# Patient Record
Sex: FEMALE | Race: WHITE | NOT HISPANIC OR LATINO
[De-identification: names, ages, dates, MRNs, and addresses within clinical notes are randomized per-mention and may not be internally consistent; named-entity substitution may affect disease eponyms.]

---

## 2017-04-27 ENCOUNTER — TRANSCRIPTION ENCOUNTER (OUTPATIENT)
Age: 32
End: 2017-04-27

## 2018-06-01 ENCOUNTER — TRANSCRIPTION ENCOUNTER (OUTPATIENT)
Age: 33
End: 2018-06-01

## 2022-04-06 ENCOUNTER — NON-APPOINTMENT (OUTPATIENT)
Age: 37
End: 2022-04-06

## 2022-04-06 DIAGNOSIS — Z80.3 FAMILY HISTORY OF MALIGNANT NEOPLASM OF BREAST: ICD-10-CM

## 2022-04-06 DIAGNOSIS — Z86.19 PERSONAL HISTORY OF OTHER INFECTIOUS AND PARASITIC DISEASES: ICD-10-CM

## 2022-04-06 DIAGNOSIS — Z82.49 FAMILY HISTORY OF ISCHEMIC HEART DISEASE AND OTHER DISEASES OF THE CIRCULATORY SYSTEM: ICD-10-CM

## 2022-04-06 DIAGNOSIS — Z78.9 OTHER SPECIFIED HEALTH STATUS: ICD-10-CM

## 2022-04-06 DIAGNOSIS — Z98.890 OTHER SPECIFIED POSTPROCEDURAL STATES: ICD-10-CM

## 2022-04-06 DIAGNOSIS — Z87.59 PERSONAL HISTORY OF OTHER COMPLICATIONS OF PREGNANCY, CHILDBIRTH AND THE PUERPERIUM: ICD-10-CM

## 2022-04-06 RX ORDER — MEDROXYPROGESTERONE ACETATE 400 MG/ML
INJECTION, SUSPENSION INTRAMUSCULAR
Refills: 0 | Status: ACTIVE | COMMUNITY

## 2022-09-02 ENCOUNTER — APPOINTMENT (OUTPATIENT)
Dept: OBGYN | Facility: CLINIC | Age: 37
End: 2022-09-02

## 2022-09-02 VITALS — WEIGHT: 188 LBS | HEART RATE: 80 BPM | HEIGHT: 66 IN | BODY MASS INDEX: 30.22 KG/M2

## 2022-09-02 LAB
HCG UR QL: POSITIVE
QUALITY CONTROL: YES

## 2022-09-02 PROCEDURE — 99395 PREV VISIT EST AGE 18-39: CPT

## 2022-09-02 PROCEDURE — 81025 URINE PREGNANCY TEST: CPT

## 2022-09-02 RX ORDER — FOLIC ACID 1 MG/1
1 TABLET ORAL DAILY
Qty: 90 | Refills: 3 | Status: ACTIVE | COMMUNITY
Start: 2022-09-02 | End: 1900-01-01

## 2022-09-02 NOTE — PHYSICAL EXAM
[Chaperone Present] : A chaperone was present in the examining room during all aspects of the physical examination [Appropriately responsive] : appropriately responsive [Alert] : alert [No Acute Distress] : no acute distress [Regular Rate Rhythm] : regular rate rhythm [No Murmurs] : no murmurs [Clear to Auscultation B/L] : clear to auscultation bilaterally [Soft] : soft [Non-tender] : non-tender [Non-distended] : non-distended [No Lesions] : no lesions [No Mass] : no mass [Oriented x3] : oriented x3 [Examination Of The Breasts] : a normal appearance [No Masses] : no breast masses were palpable [Labia Majora] : normal [Labia Minora] : normal [Normal] : normal [Enlarged ___ wks] : enlarged [unfilled] ~Uweeks [Anteversion] : anteverted [Uterine Adnexae] : normal [FreeTextEntry6] : No masses, nontender, no skin changes, nipple discharge, no adenopathy. [Tenderness] : nontender

## 2022-09-02 NOTE — PLAN
[FreeTextEntry1] : Patient is a 36-year-old  6 para 2-0-3-2 last menstrual period 2022\par Patient presents for a evaluation after having had a positive home pregnancy test\par Patient conceived while on birth control pills\par Physical exam reveals a well-developed well-nourished female slightly obese,,, BMI 30\par Heart regular rhythm and rate, lungs clear, breast no mass nontender no skin change no nipple discharge no adenopathy, abdomen soft nontender no organomegaly.\par Pelvic exam shows normal female external genitalia, vagina no lesions, cervix appropriate size nontender, uterus anteverted slightly bulky nontender, adnexa no mass nontender.\par Vaginal culture performed\par Pap smear performed\par Patient is a multivitamin and a prescription for folic acid will be sent to the pharmacy\par Patient states she was diagnosed with COVID back in 2021,,, denies being vaccinated\par Patient will be referred to [facility] for an OB sono for dating also verification of the pregnancy,,, patient is recent loss within the past year showed fetal calvarium head abnormalities on sono evaluation

## 2022-09-02 NOTE — HISTORY OF PRESENT ILLNESS
[FreeTextEntry1] : Patient is a 36-year-old  6 para 2-0-3-2 last menstrual period 2022\par Patient presents for initial visit after having a positive home pregnancy test\par Patient had been on birth control pills low Loestrin the entire time

## 2022-09-06 ENCOUNTER — ASOB RESULT (OUTPATIENT)
Age: 37
End: 2022-09-06

## 2022-09-06 ENCOUNTER — APPOINTMENT (OUTPATIENT)
Dept: ANTEPARTUM | Facility: CLINIC | Age: 37
End: 2022-09-06

## 2022-09-06 PROCEDURE — 76817 TRANSVAGINAL US OBSTETRIC: CPT

## 2022-09-20 ENCOUNTER — NON-APPOINTMENT (OUTPATIENT)
Age: 37
End: 2022-09-20

## 2022-09-20 ENCOUNTER — APPOINTMENT (OUTPATIENT)
Dept: OBGYN | Facility: CLINIC | Age: 37
End: 2022-09-20

## 2022-09-20 VITALS
DIASTOLIC BLOOD PRESSURE: 90 MMHG | SYSTOLIC BLOOD PRESSURE: 143 MMHG | BODY MASS INDEX: 30.67 KG/M2 | WEIGHT: 190 LBS | HEART RATE: 78 BPM

## 2022-09-20 PROCEDURE — 0500F INITIAL PRENATAL CARE VISIT: CPT

## 2022-09-20 RX ORDER — LABETALOL HYDROCHLORIDE 100 MG/1
100 TABLET, FILM COATED ORAL
Qty: 180 | Refills: 3 | Status: ACTIVE | COMMUNITY
Start: 2022-09-20 | End: 1900-01-01

## 2022-09-23 DIAGNOSIS — B96.89 ACUTE VAGINITIS: ICD-10-CM

## 2022-09-23 DIAGNOSIS — N76.0 ACUTE VAGINITIS: ICD-10-CM

## 2022-09-23 LAB
A VAGINAE DNA VAG QL NAA+PROBE: NORMAL
BILIRUB UR QL STRIP: NORMAL
BVAB2 DNA VAG QL NAA+PROBE: ABNORMAL
C KRUSEI DNA VAG QL NAA+PROBE: NEGATIVE
C TRACH RRNA SPEC QL NAA+PROBE: NEGATIVE
GLUCOSE UR-MCNC: NORMAL
HCG UR QL: 0.2 EU/DL
HGB UR QL STRIP.AUTO: NORMAL
HPV HIGH+LOW RISK DNA PNL CVX: NOT DETECTED
KETONES UR-MCNC: NORMAL
LEUKOCYTE ESTERASE UR QL STRIP: NORMAL
MEGA1 DNA VAG QL NAA+PROBE: ABNORMAL
N GONORRHOEA RRNA SPEC QL NAA+PROBE: NEGATIVE
NITRITE UR QL STRIP: NORMAL
PH UR STRIP: 5.5
PROT UR STRIP-MCNC: NORMAL
SP GR UR STRIP: 1
T VAGINALIS RRNA SPEC QL NAA+PROBE: NEGATIVE

## 2022-09-27 ENCOUNTER — ASOB RESULT (OUTPATIENT)
Age: 37
End: 2022-09-27

## 2022-09-27 ENCOUNTER — NON-APPOINTMENT (OUTPATIENT)
Age: 37
End: 2022-09-27

## 2022-09-27 ENCOUNTER — APPOINTMENT (OUTPATIENT)
Dept: ANTEPARTUM | Facility: CLINIC | Age: 37
End: 2022-09-27

## 2022-09-27 PROCEDURE — 36415 COLL VENOUS BLD VENIPUNCTURE: CPT

## 2022-09-27 PROCEDURE — 76813 OB US NUCHAL MEAS 1 GEST: CPT

## 2022-10-02 LAB — FIRST TRIMESTER SCREEN TEST RESULTS: NORMAL

## 2022-10-04 ENCOUNTER — APPOINTMENT (OUTPATIENT)
Dept: OBGYN | Facility: CLINIC | Age: 37
End: 2022-10-04

## 2022-10-04 VITALS
BODY MASS INDEX: 30.02 KG/M2 | HEART RATE: 72 BPM | SYSTOLIC BLOOD PRESSURE: 127 MMHG | WEIGHT: 186 LBS | DIASTOLIC BLOOD PRESSURE: 82 MMHG

## 2022-10-04 LAB
BILIRUB UR QL STRIP: NORMAL
GLUCOSE UR-MCNC: NORMAL
HCG UR QL: 0.2 EU/DL
HGB UR QL STRIP.AUTO: NORMAL
KETONES UR-MCNC: NORMAL
LEUKOCYTE ESTERASE UR QL STRIP: NORMAL
NITRITE UR QL STRIP: NORMAL
PH UR STRIP: 5.5
PROT UR STRIP-MCNC: NORMAL
SP GR UR STRIP: 1.03

## 2022-10-04 PROCEDURE — 0502F SUBSEQUENT PRENATAL CARE: CPT

## 2022-10-18 ENCOUNTER — APPOINTMENT (OUTPATIENT)
Dept: OBGYN | Facility: CLINIC | Age: 37
End: 2022-10-18

## 2022-11-01 ENCOUNTER — NON-APPOINTMENT (OUTPATIENT)
Age: 37
End: 2022-11-01

## 2022-11-01 ENCOUNTER — APPOINTMENT (OUTPATIENT)
Dept: OBGYN | Facility: CLINIC | Age: 37
End: 2022-11-01

## 2022-11-01 VITALS
WEIGHT: 186 LBS | BODY MASS INDEX: 30.02 KG/M2 | SYSTOLIC BLOOD PRESSURE: 128 MMHG | HEART RATE: 86 BPM | DIASTOLIC BLOOD PRESSURE: 82 MMHG

## 2022-11-01 LAB
BILIRUB UR QL STRIP: NORMAL
GLUCOSE UR-MCNC: NORMAL
HCG UR QL: 0.2 EU/DL
HGB UR QL STRIP.AUTO: NORMAL
KETONES UR-MCNC: NORMAL
LEUKOCYTE ESTERASE UR QL STRIP: NORMAL
NITRITE UR QL STRIP: NORMAL
PH UR STRIP: 6.5
PROT UR STRIP-MCNC: NORMAL
SP GR UR STRIP: 1.02

## 2022-11-01 PROCEDURE — 0502F SUBSEQUENT PRENATAL CARE: CPT

## 2022-11-03 ENCOUNTER — NON-APPOINTMENT (OUTPATIENT)
Age: 37
End: 2022-11-03

## 2022-11-04 ENCOUNTER — NON-APPOINTMENT (OUTPATIENT)
Age: 37
End: 2022-11-04

## 2022-11-17 LAB
ABO + RH PNL BLD: NORMAL
ALBUMIN SERPL ELPH-MCNC: 3.7 G/DL
ALP BLD-CCNC: 68 U/L
ALT SERPL-CCNC: 16 U/L
ANION GAP SERPL CALC-SCNC: 14 MMOL/L
AST SERPL-CCNC: 10 U/L
BASOPHILS # BLD AUTO: 0.02 K/UL
BASOPHILS NFR BLD AUTO: 0.2 %
BILIRUB SERPL-MCNC: <0.2 MG/DL
BLD GP AB SCN SERPL QL: NORMAL
BUN SERPL-MCNC: 9 MG/DL
CALCIUM SERPL-MCNC: 9.2 MG/DL
CHLORIDE SERPL-SCNC: 105 MMOL/L
CMV IGG SERPL QL: <0.2 U/ML
CMV IGG SERPL-IMP: NEGATIVE
CMV IGM SERPL QL: <8 AU/ML
CMV IGM SERPL QL: NEGATIVE
CO2 SERPL-SCNC: 21 MMOL/L
CREAT SERPL-MCNC: 0.59 MG/DL
EGFR: 118 ML/MIN/1.73M2
EOSINOPHIL # BLD AUTO: 0.15 K/UL
EOSINOPHIL NFR BLD AUTO: 1.6 %
GLUCOSE SERPL-MCNC: 88 MG/DL
HBV SURFACE AG SER QL: NONREACTIVE
HCT VFR BLD CALC: 35.2 %
HCV AB SER QL: NONREACTIVE
HCV S/CO RATIO: 0.1 S/CO
HGB A MFR BLD: 97.6 %
HGB A2 MFR BLD: 2.4 %
HGB BLD-MCNC: 11.7 G/DL
HGB FRACT BLD-IMP: NORMAL
HIV1+2 AB SPEC QL IA.RAPID: NONREACTIVE
IMM GRANULOCYTES NFR BLD AUTO: 0.4 %
LEAD BLD-MCNC: <1 UG/DL
LYMPHOCYTES # BLD AUTO: 1.63 K/UL
LYMPHOCYTES NFR BLD AUTO: 17 %
MAN DIFF?: NORMAL
MCHC RBC-ENTMCNC: 32 PG
MCHC RBC-ENTMCNC: 33.2 GM/DL
MCV RBC AUTO: 96.2 FL
MEV IGG FLD QL IA: 52.1 AU/ML
MEV IGG+IGM SER-IMP: POSITIVE
MONOCYTES # BLD AUTO: 0.56 K/UL
MONOCYTES NFR BLD AUTO: 5.8 %
MUV AB SER-ACNC: NEGATIVE
MUV IGG SER QL IA: <5 AU/ML
NEUTROPHILS # BLD AUTO: 7.21 K/UL
NEUTROPHILS NFR BLD AUTO: 75 %
PLATELET # BLD AUTO: 255 K/UL
POTASSIUM SERPL-SCNC: 3.9 MMOL/L
PROT SERPL-MCNC: 6.3 G/DL
RBC # BLD: 3.66 M/UL
RBC # FLD: 12.4 %
RUBV IGG FLD-ACNC: 0.7 INDEX
RUBV IGG SER-IMP: NEGATIVE
SODIUM SERPL-SCNC: 139 MMOL/L
T GONDII AB SER-IMP: NEGATIVE
T GONDII AB SER-IMP: NEGATIVE
T GONDII IGG SER QL: <3 IU/ML
T GONDII IGM SER QL: <3 AU/ML
TSH SERPL-ACNC: 1.3 UIU/ML
VZV AB TITR SER: POSITIVE
VZV IGG SER IF-ACNC: 889.5 INDEX
WBC # FLD AUTO: 9.61 K/UL

## 2022-11-18 LAB
RUBV IGM FLD-ACNC: <20 AU/ML
VZV IGM SER IF-ACNC: <0.91 INDEX

## 2022-11-21 LAB
B19V IGG SER QL IA: 3.04 INDEX
B19V IGG+IGM SER-IMP: NORMAL
B19V IGG+IGM SER-IMP: POSITIVE
B19V IGM FLD-ACNC: 0.15 INDEX
B19V IGM SER-ACNC: NEGATIVE
ESTIMATED AVERAGE GLUCOSE: 100 MG/DL
HBA1C MFR BLD HPLC: 5.1 %
T PALLIDUM AB SER QL IA: NEGATIVE

## 2022-11-29 ENCOUNTER — ASOB RESULT (OUTPATIENT)
Age: 37
End: 2022-11-29

## 2022-11-29 ENCOUNTER — APPOINTMENT (OUTPATIENT)
Dept: MATERNAL FETAL MEDICINE | Facility: CLINIC | Age: 37
End: 2022-11-29

## 2022-11-29 ENCOUNTER — NON-APPOINTMENT (OUTPATIENT)
Age: 37
End: 2022-11-29

## 2022-11-29 ENCOUNTER — APPOINTMENT (OUTPATIENT)
Dept: ANTEPARTUM | Facility: CLINIC | Age: 37
End: 2022-11-29

## 2022-11-29 ENCOUNTER — APPOINTMENT (OUTPATIENT)
Dept: OBGYN | Facility: CLINIC | Age: 37
End: 2022-11-29

## 2022-11-29 VITALS
BODY MASS INDEX: 29.91 KG/M2 | WEIGHT: 186.13 LBS | HEART RATE: 88 BPM | DIASTOLIC BLOOD PRESSURE: 80 MMHG | RESPIRATION RATE: 16 BRPM | HEIGHT: 66 IN | SYSTOLIC BLOOD PRESSURE: 114 MMHG | OXYGEN SATURATION: 98 %

## 2022-11-29 VITALS
DIASTOLIC BLOOD PRESSURE: 75 MMHG | WEIGHT: 186 LBS | HEART RATE: 79 BPM | BODY MASS INDEX: 30.02 KG/M2 | SYSTOLIC BLOOD PRESSURE: 112 MMHG

## 2022-11-29 PROCEDURE — ZZZZZ: CPT

## 2022-11-29 PROCEDURE — 76817 TRANSVAGINAL US OBSTETRIC: CPT | Mod: 59

## 2022-11-29 PROCEDURE — 0502F SUBSEQUENT PRENATAL CARE: CPT

## 2022-11-29 PROCEDURE — 81002 URINALYSIS NONAUTO W/O SCOPE: CPT

## 2022-11-29 PROCEDURE — 76811 OB US DETAILED SNGL FETUS: CPT

## 2022-11-29 PROCEDURE — 36415 COLL VENOUS BLD VENIPUNCTURE: CPT

## 2022-11-29 PROCEDURE — 99214 OFFICE O/P EST MOD 30 MIN: CPT

## 2022-11-29 RX ORDER — KRILL/OM-3/DHA/EPA/PHOSPHO/AST 1000-230MG
81 CAPSULE ORAL
Refills: 0 | Status: ACTIVE | COMMUNITY

## 2022-11-29 NOTE — DISCUSSION/SUMMARY
[FreeTextEntry1] : The patient is a 37-year-old -0-3-2 being seen today at 20 weeks for advanced maternal age, maternal obesity, previous  section and chronic hypertension.\par \par Her obstetrical history is significant for delivery in  of a liveborn female  weighing 7 pounds 10 ounces delivered at 39 weeks via  section done electively for ultrasound finding of cord around the neck.  Subsequent pregnancy in  a liveborn female  weighing 7 pounds 10 ounces also delivered at 39 weeks via elective  section due to previous  section.  No problems or complications noted with either of those pregnancies.  She has had 3 first trimester miscarriages 1 requiring D&C and the other 2 treated using medical induction.\par \par A comprehensive ultrasound was performed today and reveals a single viable intrauterine gestation with size consistent with dates.  No gross or soft markers noted associated with fetal aneuploidy.  Limitations were noted.  Vital signs today reveal a blood pressure of 114/80 and maternal weight is 186.2 pounds consistent with a BMI of 30.04 kg.\par \par Chronic hypertension;\par \par Chronic hypertension in pregnancy was discussed.  The patient is on 100 mg of labetalol twice a day.  She states that for the past 2 years her blood pressures have been labile and borderline and medical intervention has not occurred.  During this pregnancy her blood pressure was noted to be elevated and she was started on labetalol.  In addition she is taking low-dose aspirin alternating 1 tablet and 2 tablets.  Problems and complication related to a pregnancy with chronic hypertension were discussed.  Patient is at increased risk for fetal growth restriction.  She will return in 2 weeks for targeted ultrasound and in 6 weeks for a growth scan.  Growth scans will be performed every 4 weeks until the end of pregnancy.  In addition weekly  testing beginning at approximately 32 weeks will also be recommended.  Pregnant patients with hypertension are best delivered between 37 and 39 weeksn based on clinical presentation.  Patient's with chronic hypertension should have blood pressures of 120-140 systolic over 80-90 diastolic.  The patient should call your office should her systolic blood pressure be greater than 160 or diastolic blood pressure be greater than 110.  She understands she is also at increased risk for superimposed preeclampsia which may put her at increased risk for  delivery with its associated morbidity and mortality.  Of note the father this pregnancy is a new father and therefore increases her risk for superimposed preeclampsia.   Signs and symptoms or preeclampsia were discussed and should be reinforced later in the pregnancy.   All the above was discussed with the patient and all of her questions were answered.\par \par Advanced maternal age;\par \par The patient has not had genetic counseling.  She did have Ultra-Screen evaluation which demonstrated low risk for fetal aneuploidy.  Sequential blood work was obtained in our office today.  In addition due to her age and her family history I recommend a 3-hour glucose tolerance test be performed between 24 and 28 weeks.\par \par COVID-19 vaccination;\par \par COVID-19 vaccination pregnancy was discussed.  Risks and benefits of vaccination were discussed and after all the patient's questions answered patient has declined vaccination during pregnancy.  She has certain comorbidities that put her at increased risk for complications with a COVID infection.  She will call your office should she have a COVID infection to discuss various treatment options.\par \par Her father has hypertension and diabetes.  She was diagnosed with hypertension as well as hyperglycemia.  She has had 2  section and 1 D&C.  She has no known allergies to medications and denies alcohol, tobacco or drug use.\par \par I spent a total of 47 minutes reviewing the patient's prenatal record, prenatal blood work, outside medical records and ultrasound reports counseling and coordinating care.\par \par Recommendations;\par \par 1.  Continue 100 mg of labetalol twice a day.\par 2.  Patient will call the office should her systolic blood pressure be greater than 160 or diastolic blood pressure greater than 110.\par 3.  Targeted ultrasound in 2 weeks is recommended.\par 4.  Growth scan in 6 weeks is recommended.\par 5.  Low-dose aspirin alternating 1 tablet and 2 tablets into 1 week prior to delivery.\par 6.  3 hour glucose tolerance test between 24 and 28 weeks.\par 7.  Sequential blood work obtained in our office today.\par 8.  Follow-up maternal-fetal medicine consultation in 10 weeks.\par 9.  Delivery between 37 and 39 weeks is recommended.

## 2022-12-01 LAB
BILIRUB UR QL STRIP: NORMAL
GLUCOSE UR-MCNC: NORMAL
HCG UR QL: 0.2 EU/DL
HGB UR QL STRIP.AUTO: NORMAL
KETONES UR-MCNC: NORMAL
LEUKOCYTE ESTERASE UR QL STRIP: NORMAL
NITRITE UR QL STRIP: NORMAL
PH UR STRIP: 7
PROT UR STRIP-MCNC: NORMAL
SP GR UR STRIP: 1.01

## 2022-12-02 LAB
ADDITIONAL US: NORMAL
AFP MOM: 1.12
AFP VALUE: 60.2 NG/ML
COLLECTED ON 2: NORMAL
COLLECTED ON: NORMAL
CRL SCAN TWIN B: NORMAL
CRL SCAN: NORMAL
CROWN RUMP LENGTH TWIN B: NORMAL
CROWN RUMP LENGTH: 54.7 MM
DIA MOM: 0.74
DIA VALUE: 134.3 PG/ML
DOWN SYNDROME AGE RISK: NORMAL
DOWN SYNDROME INTERPRETATION: NORMAL
DOWN SYNDROME SCREENING RISK: NORMAL
FIRST TRIMESTER SAMPLE: NORMAL
GEST. AGE ON COLLECTION DATE: 11.9 WEEKS
GESTATIONAL AGE: 20.9 WEEKS
HCG MOM: 0.64
HCG VALUE: 13 IU/ML
INSULIN DEP DIABETES: NO
MATERNAL AGE AT EDD: 39.6 YR
NT MOM TWIN B: NORMAL
NT TWIN B: NORMAL
NUCHAL TRANSLUCENCY (NT): 1.5 MM
NUCHAL TRANSLUCENCY MOM: 1.16
NUMBER OF FETUSES: 1
OPEN SPINA BIFIDA: NORMAL
OSB INTERPRETATION: NORMAL
PAPP-A MOM: 0.48
PAPP-A VALUE: 282 NG/ML
RACE: NORMAL
SECOND TRIMESTER SAMPLE: NORMAL
SEQUENTIAL 2 COMMENTS: NORMAL
SEQUENTIAL 2 NOTE: NORMAL
SEQUENTIAL 2 RESULTS: NORMAL
SEQUENTIAL 2 TEST RESULTS: NORMAL
SONOGRAPHER ID#: NORMAL
TRISOMY 18 AGE RISK: NORMAL
TRISOMY 18 INTERPRETATION: NORMAL
TRISOMY 18 SCREENING RISK: NORMAL
UE3 MOM: 0.6
UE3 VALUE: 1.54 NG/ML
WEIGHT AFP: 183 LBS
WEIGHT: 186 LBS

## 2022-12-08 ENCOUNTER — NON-APPOINTMENT (OUTPATIENT)
Age: 37
End: 2022-12-08

## 2022-12-13 ENCOUNTER — ASOB RESULT (OUTPATIENT)
Age: 37
End: 2022-12-13

## 2022-12-13 ENCOUNTER — APPOINTMENT (OUTPATIENT)
Dept: ANTEPARTUM | Facility: CLINIC | Age: 37
End: 2022-12-13

## 2022-12-13 PROCEDURE — 76816 OB US FOLLOW-UP PER FETUS: CPT

## 2022-12-21 ENCOUNTER — TRANSCRIPTION ENCOUNTER (OUTPATIENT)
Age: 37
End: 2022-12-21

## 2022-12-27 ENCOUNTER — APPOINTMENT (OUTPATIENT)
Dept: OBGYN | Facility: CLINIC | Age: 37
End: 2022-12-27

## 2022-12-27 VITALS
HEART RATE: 76 BPM | HEIGHT: 66 IN | SYSTOLIC BLOOD PRESSURE: 119 MMHG | WEIGHT: 195 LBS | BODY MASS INDEX: 31.34 KG/M2 | DIASTOLIC BLOOD PRESSURE: 77 MMHG

## 2022-12-27 LAB
BILIRUB UR QL STRIP: NORMAL
GLUCOSE UR-MCNC: NORMAL
HCG UR QL: 0.2 EU/DL
HGB UR QL STRIP.AUTO: NORMAL
KETONES UR-MCNC: NORMAL
LEUKOCYTE ESTERASE UR QL STRIP: NORMAL
NITRITE UR QL STRIP: NORMAL
PH UR STRIP: 7
PROT UR STRIP-MCNC: NORMAL
SP GR UR STRIP: 1.02

## 2022-12-27 PROCEDURE — 0502F SUBSEQUENT PRENATAL CARE: CPT

## 2022-12-27 PROCEDURE — 81002 URINALYSIS NONAUTO W/O SCOPE: CPT

## 2022-12-28 LAB
APPEARANCE: ABNORMAL
BILIRUBIN URINE: NEGATIVE
BLOOD URINE: NEGATIVE
COLOR: NORMAL
GLUCOSE QUALITATIVE U: NEGATIVE
KETONES URINE: NEGATIVE
LEUKOCYTE ESTERASE URINE: ABNORMAL
NITRITE URINE: NEGATIVE
PH URINE: 6.5
PROTEIN URINE: NORMAL
SPECIFIC GRAVITY URINE: 1.01
UROBILINOGEN URINE: NORMAL

## 2022-12-29 LAB — BACTERIA UR CULT: NORMAL

## 2023-01-24 ENCOUNTER — NON-APPOINTMENT (OUTPATIENT)
Age: 38
End: 2023-01-24

## 2023-01-31 ENCOUNTER — NON-APPOINTMENT (OUTPATIENT)
Age: 38
End: 2023-01-31

## 2023-01-31 ENCOUNTER — APPOINTMENT (OUTPATIENT)
Dept: OBGYN | Facility: CLINIC | Age: 38
End: 2023-01-31
Payer: COMMERCIAL

## 2023-01-31 VITALS
HEIGHT: 66 IN | DIASTOLIC BLOOD PRESSURE: 75 MMHG | HEART RATE: 90 BPM | WEIGHT: 199 LBS | SYSTOLIC BLOOD PRESSURE: 127 MMHG | BODY MASS INDEX: 31.98 KG/M2

## 2023-01-31 LAB
BASOPHILS # BLD AUTO: 0.03 K/UL
BASOPHILS NFR BLD AUTO: 0.4 %
BILIRUB UR QL STRIP: NEGATIVE
CLARITY UR: CLEAR
COLLECTION METHOD: NORMAL
EOSINOPHIL # BLD AUTO: 0.14 K/UL
EOSINOPHIL NFR BLD AUTO: 1.8 %
GLUCOSE UR-MCNC: NEGATIVE
HCG UR QL: 1 EU/DL
HCT VFR BLD CALC: 37.2 %
HGB BLD-MCNC: 11.9 G/DL
HGB UR QL STRIP.AUTO: NEGATIVE
IMM GRANULOCYTES NFR BLD AUTO: 0.6 %
KETONES UR-MCNC: NORMAL
LEUKOCYTE ESTERASE UR QL STRIP: NORMAL
LYMPHOCYTES # BLD AUTO: 1.3 K/UL
LYMPHOCYTES NFR BLD AUTO: 16.4 %
MAN DIFF?: NORMAL
MCHC RBC-ENTMCNC: 31.5 PG
MCHC RBC-ENTMCNC: 32 GM/DL
MCV RBC AUTO: 98.4 FL
MONOCYTES # BLD AUTO: 0.55 K/UL
MONOCYTES NFR BLD AUTO: 6.9 %
NEUTROPHILS # BLD AUTO: 5.87 K/UL
NEUTROPHILS NFR BLD AUTO: 73.9 %
NITRITE UR QL STRIP: NEGATIVE
PH UR STRIP: 7
PLATELET # BLD AUTO: 251 K/UL
PROT UR STRIP-MCNC: NEGATIVE
RBC # BLD: 3.78 M/UL
RBC # FLD: 13.5 %
SP GR UR STRIP: 1.02
WBC # FLD AUTO: 7.94 K/UL

## 2023-01-31 PROCEDURE — 81002 URINALYSIS NONAUTO W/O SCOPE: CPT

## 2023-01-31 PROCEDURE — 0502F SUBSEQUENT PRENATAL CARE: CPT

## 2023-02-07 ENCOUNTER — APPOINTMENT (OUTPATIENT)
Dept: ANTEPARTUM | Facility: CLINIC | Age: 38
End: 2023-02-07
Payer: COMMERCIAL

## 2023-02-07 ENCOUNTER — APPOINTMENT (OUTPATIENT)
Dept: MATERNAL FETAL MEDICINE | Facility: CLINIC | Age: 38
End: 2023-02-07

## 2023-02-07 ENCOUNTER — ASOB RESULT (OUTPATIENT)
Age: 38
End: 2023-02-07

## 2023-02-07 PROCEDURE — 76816 OB US FOLLOW-UP PER FETUS: CPT

## 2023-02-13 DIAGNOSIS — D25.9 LEIOMYOMA OF UTERUS, UNSPECIFIED: ICD-10-CM

## 2023-02-13 DIAGNOSIS — Z3A.20 20 WEEKS GESTATION OF PREGNANCY: ICD-10-CM

## 2023-02-13 DIAGNOSIS — Z34.92 ENCOUNTER FOR SUPERVISION OF NORMAL PREGNANCY, UNSPECIFIED, SECOND TRIMESTER: ICD-10-CM

## 2023-02-14 ENCOUNTER — APPOINTMENT (OUTPATIENT)
Dept: MATERNAL FETAL MEDICINE | Facility: CLINIC | Age: 38
End: 2023-02-14
Payer: COMMERCIAL

## 2023-02-14 ENCOUNTER — ASOB RESULT (OUTPATIENT)
Age: 38
End: 2023-02-14

## 2023-02-14 VITALS — BODY MASS INDEX: 31.98 KG/M2 | HEIGHT: 66 IN | WEIGHT: 199 LBS

## 2023-02-14 DIAGNOSIS — Z78.9 OTHER SPECIFIED HEALTH STATUS: ICD-10-CM

## 2023-02-14 DIAGNOSIS — Z83.3 FAMILY HISTORY OF DIABETES MELLITUS: ICD-10-CM

## 2023-02-14 PROCEDURE — G0108 DIAB MANAGE TRN  PER INDIV: CPT | Mod: 95

## 2023-02-15 RX ORDER — LANCETS 28 GAUGE
EACH MISCELLANEOUS
Qty: 1 | Refills: 3 | Status: ACTIVE | COMMUNITY
Start: 2023-02-14 | End: 1900-01-01

## 2023-02-15 RX ORDER — BLOOD SUGAR DIAGNOSTIC
STRIP MISCELLANEOUS
Qty: 1 | Refills: 3 | Status: ACTIVE | COMMUNITY
Start: 2023-02-14 | End: 1900-01-01

## 2023-02-15 RX ORDER — BLOOD-GLUCOSE METER
W/DEVICE KIT MISCELLANEOUS
Qty: 1 | Refills: 0 | Status: ACTIVE | COMMUNITY
Start: 2023-02-14 | End: 1900-01-01

## 2023-02-21 ENCOUNTER — ASOB RESULT (OUTPATIENT)
Age: 38
End: 2023-02-21

## 2023-02-21 ENCOUNTER — APPOINTMENT (OUTPATIENT)
Dept: ANTEPARTUM | Facility: CLINIC | Age: 38
End: 2023-02-21
Payer: COMMERCIAL

## 2023-02-21 ENCOUNTER — APPOINTMENT (OUTPATIENT)
Dept: OBGYN | Facility: CLINIC | Age: 38
End: 2023-02-21
Payer: COMMERCIAL

## 2023-02-21 VITALS
BODY MASS INDEX: 31.82 KG/M2 | SYSTOLIC BLOOD PRESSURE: 118 MMHG | DIASTOLIC BLOOD PRESSURE: 76 MMHG | HEIGHT: 66 IN | WEIGHT: 198 LBS | HEART RATE: 86 BPM

## 2023-02-21 LAB
BILIRUB UR QL STRIP: NEGATIVE
CLARITY UR: NORMAL
COLLECTION METHOD: NORMAL
GLUCOSE UR-MCNC: NEGATIVE
HCG UR QL: 0.2 EU/DL
HGB UR QL STRIP.AUTO: NORMAL
KETONES UR-MCNC: NEGATIVE
LEUKOCYTE ESTERASE UR QL STRIP: NORMAL
NITRITE UR QL STRIP: NEGATIVE
PH UR STRIP: 6
PROT UR STRIP-MCNC: NEGATIVE
SP GR UR STRIP: 1.01

## 2023-02-21 PROCEDURE — ZZZZZ: CPT

## 2023-02-21 PROCEDURE — 76818 FETAL BIOPHYS PROFILE W/NST: CPT

## 2023-02-21 PROCEDURE — 0502F SUBSEQUENT PRENATAL CARE: CPT

## 2023-02-21 PROCEDURE — 81002 URINALYSIS NONAUTO W/O SCOPE: CPT

## 2023-02-22 ENCOUNTER — APPOINTMENT (OUTPATIENT)
Dept: ANTEPARTUM | Facility: CLINIC | Age: 38
End: 2023-02-22
Payer: COMMERCIAL

## 2023-02-22 ENCOUNTER — ASOB RESULT (OUTPATIENT)
Age: 38
End: 2023-02-22

## 2023-02-22 ENCOUNTER — APPOINTMENT (OUTPATIENT)
Dept: MATERNAL FETAL MEDICINE | Facility: CLINIC | Age: 38
End: 2023-02-22
Payer: COMMERCIAL

## 2023-02-22 VITALS — HEIGHT: 66 IN | WEIGHT: 198 LBS | BODY MASS INDEX: 31.82 KG/M2

## 2023-02-22 PROCEDURE — G0108 DIAB MANAGE TRN  PER INDIV: CPT | Mod: 95

## 2023-02-22 PROCEDURE — ZZZZZ: CPT

## 2023-02-28 ENCOUNTER — APPOINTMENT (OUTPATIENT)
Dept: ANTEPARTUM | Facility: CLINIC | Age: 38
End: 2023-02-28

## 2023-03-06 ENCOUNTER — NON-APPOINTMENT (OUTPATIENT)
Age: 38
End: 2023-03-06

## 2023-03-07 ENCOUNTER — APPOINTMENT (OUTPATIENT)
Dept: OBGYN | Facility: CLINIC | Age: 38
End: 2023-03-07
Payer: COMMERCIAL

## 2023-03-07 ENCOUNTER — ASOB RESULT (OUTPATIENT)
Age: 38
End: 2023-03-07

## 2023-03-07 ENCOUNTER — APPOINTMENT (OUTPATIENT)
Dept: ANTEPARTUM | Facility: CLINIC | Age: 38
End: 2023-03-07
Payer: COMMERCIAL

## 2023-03-07 ENCOUNTER — APPOINTMENT (OUTPATIENT)
Dept: MATERNAL FETAL MEDICINE | Facility: CLINIC | Age: 38
End: 2023-03-07
Payer: COMMERCIAL

## 2023-03-07 VITALS
BODY MASS INDEX: 32.6 KG/M2 | SYSTOLIC BLOOD PRESSURE: 120 MMHG | DIASTOLIC BLOOD PRESSURE: 72 MMHG | WEIGHT: 202 LBS | HEART RATE: 87 BPM

## 2023-03-07 VITALS
SYSTOLIC BLOOD PRESSURE: 110 MMHG | OXYGEN SATURATION: 97 % | RESPIRATION RATE: 18 BRPM | HEIGHT: 66 IN | HEART RATE: 72 BPM | BODY MASS INDEX: 32.47 KG/M2 | DIASTOLIC BLOOD PRESSURE: 62 MMHG | WEIGHT: 202 LBS

## 2023-03-07 DIAGNOSIS — E66.9 OBESITY, UNSPECIFIED: ICD-10-CM

## 2023-03-07 LAB
BILIRUB UR QL STRIP: NORMAL
GLUCOSE UR-MCNC: NORMAL
HCG UR QL: 0.2 EU/DL
HGB UR QL STRIP.AUTO: NORMAL
KETONES UR-MCNC: NORMAL
LEUKOCYTE ESTERASE UR QL STRIP: NORMAL
NITRITE UR QL STRIP: NORMAL
PH UR STRIP: 6
PROT UR STRIP-MCNC: NORMAL
SP GR UR STRIP: 1.01

## 2023-03-07 PROCEDURE — 76818 FETAL BIOPHYS PROFILE W/NST: CPT

## 2023-03-07 PROCEDURE — 76816 OB US FOLLOW-UP PER FETUS: CPT

## 2023-03-07 PROCEDURE — 76820 UMBILICAL ARTERY ECHO: CPT | Mod: 59

## 2023-03-07 PROCEDURE — 0502F SUBSEQUENT PRENATAL CARE: CPT

## 2023-03-07 PROCEDURE — 93976 VASCULAR STUDY: CPT

## 2023-03-07 PROCEDURE — 99213 OFFICE O/P EST LOW 20 MIN: CPT

## 2023-03-07 PROCEDURE — 81002 URINALYSIS NONAUTO W/O SCOPE: CPT

## 2023-03-07 RX ORDER — VITAMIN C, CALCIUM, IRON, VITAMIN D3, VITAMIN E, VITAMIN B1, VITAMIN B2, VITAMIN B3, VITAMIN B6, FOLIC ACID, IODINE, ZINC, COPPER, DOCUSATE SODIUM, DOCOSAHEXAENOIC ACID (DHA) 27-1-50 MG
KIT ORAL
Refills: 0 | Status: ACTIVE | COMMUNITY

## 2023-03-07 NOTE — DISCUSSION/SUMMARY
[FreeTextEntry1] : Please see the previous consultations for the patient's medical and obstetrical history.  Patient is being seen today at approximate 35 weeks for gestational diabetes, chronic hypertension, advanced maternal age and maternal obesity.\par \par A growth scan was performed today and reveals a single viable intrauterine gestation with the estimated fetal weight of 5 pounds 6 ounces consistent with the 34th percentile.  Vertex presentation with a posterior placenta seen and the amniotic fluid index is normal at 9.81 cm.  Normal umbilical artery Doppler S/D ratio.  Vital signs today reveal a blood pressure 110/62 and maternal weight is 202 pounds consistent with a BMI of 32.6 kg.\par \par Gestational diabetes;\par \par Evaluation of the patient's diabetic flowsheets continues to demonstrate good control of fasting and postprandial blood sugars.  Medical intervention is not recommended at this time.\par \par Chronic hypertension;\par \par The patient is on 100 mg of labetalol twice a day.  She will continue on this medication.  No change in the current amount of medication is recommended.  She is on low-dose aspirin which she should discontinue at 38 weeks.  She is scheduled for a repeat  section at 39 weeks.\par \par She will continue with twice weekly  testing until delivery.  Follow-up dietary consultation as scheduled.  Follow-up maternal-fetal medicine consultation if clinically indicated.  All the above was discussed with the patient, all of her questions were answered.\par \par I spent a total of 23 minutes reviewing the patient's prenatal record, prenatal blood work, outside medical records, previous consultations and ultrasound reports counseling and coordinating care.\par \par Recommendations;\par \par 1.  Continue current ADA diet and home glucose monitoring.\par 2.  Labetalol 100 mg every 12 hours.\par 3.  Twice weekly  testing until delivery.\par 4.  Follow-up dietary consultation as scheduled.\par 5.  Follow-up MFM consultation as clinically indicated.\par 6.  Patient is scheduled for repeat  section at 39 weeks.\par 7.  Discontinue low-dose aspirin at 38 weeks.

## 2023-03-14 ENCOUNTER — APPOINTMENT (OUTPATIENT)
Dept: ANTEPARTUM | Facility: CLINIC | Age: 38
End: 2023-03-14
Payer: COMMERCIAL

## 2023-03-14 ENCOUNTER — ASOB RESULT (OUTPATIENT)
Age: 38
End: 2023-03-14

## 2023-03-14 ENCOUNTER — NON-APPOINTMENT (OUTPATIENT)
Age: 38
End: 2023-03-14

## 2023-03-14 PROCEDURE — 76818 FETAL BIOPHYS PROFILE W/NST: CPT

## 2023-03-16 ENCOUNTER — NON-APPOINTMENT (OUTPATIENT)
Age: 38
End: 2023-03-16

## 2023-03-16 DIAGNOSIS — Z3A.34 34 WEEKS GESTATION OF PREGNANCY: ICD-10-CM

## 2023-03-17 ENCOUNTER — NON-APPOINTMENT (OUTPATIENT)
Age: 38
End: 2023-03-17

## 2023-03-17 ENCOUNTER — APPOINTMENT (OUTPATIENT)
Dept: OBGYN | Facility: CLINIC | Age: 38
End: 2023-03-17
Payer: COMMERCIAL

## 2023-03-17 VITALS
SYSTOLIC BLOOD PRESSURE: 116 MMHG | BODY MASS INDEX: 32.77 KG/M2 | HEART RATE: 85 BPM | DIASTOLIC BLOOD PRESSURE: 73 MMHG | WEIGHT: 203 LBS

## 2023-03-17 LAB
BILIRUB UR QL STRIP: NORMAL
GLUCOSE UR-MCNC: NORMAL
HCG UR QL: 0.2 EU/DL
HGB UR QL STRIP.AUTO: NORMAL
KETONES UR-MCNC: NORMAL
LEUKOCYTE ESTERASE UR QL STRIP: NORMAL
NITRITE UR QL STRIP: NORMAL
PH UR STRIP: 5.5
PROT UR STRIP-MCNC: NORMAL
SP GR UR STRIP: 1.02

## 2023-03-17 PROCEDURE — 0502F SUBSEQUENT PRENATAL CARE: CPT

## 2023-03-17 PROCEDURE — 81002 URINALYSIS NONAUTO W/O SCOPE: CPT

## 2023-03-20 ENCOUNTER — APPOINTMENT (OUTPATIENT)
Dept: MATERNAL FETAL MEDICINE | Facility: CLINIC | Age: 38
End: 2023-03-20
Payer: COMMERCIAL

## 2023-03-20 ENCOUNTER — ASOB RESULT (OUTPATIENT)
Age: 38
End: 2023-03-20

## 2023-03-20 VITALS — HEIGHT: 66 IN | BODY MASS INDEX: 32.14 KG/M2 | WEIGHT: 200 LBS

## 2023-03-20 LAB
B-HEM STREP SPEC QL CULT: NORMAL
BACTERIA UR CULT: NORMAL

## 2023-03-20 PROCEDURE — G0108 DIAB MANAGE TRN  PER INDIV: CPT | Mod: 95

## 2023-03-21 ENCOUNTER — ASOB RESULT (OUTPATIENT)
Age: 38
End: 2023-03-21

## 2023-03-21 ENCOUNTER — APPOINTMENT (OUTPATIENT)
Dept: ANTEPARTUM | Facility: CLINIC | Age: 38
End: 2023-03-21
Payer: COMMERCIAL

## 2023-03-21 PROCEDURE — 76818 FETAL BIOPHYS PROFILE W/NST: CPT

## 2023-03-23 ENCOUNTER — APPOINTMENT (OUTPATIENT)
Dept: OBGYN | Facility: CLINIC | Age: 38
End: 2023-03-23
Payer: COMMERCIAL

## 2023-03-23 VITALS
BODY MASS INDEX: 33.11 KG/M2 | DIASTOLIC BLOOD PRESSURE: 68 MMHG | HEIGHT: 66 IN | SYSTOLIC BLOOD PRESSURE: 107 MMHG | WEIGHT: 206 LBS | HEART RATE: 78 BPM

## 2023-03-23 LAB
BASOPHILS # BLD AUTO: 0.02 K/UL
BASOPHILS NFR BLD AUTO: 0.3 %
EOSINOPHIL # BLD AUTO: 0.05 K/UL
EOSINOPHIL NFR BLD AUTO: 0.7 %
HCT VFR BLD CALC: 34.3 %
HGB BLD-MCNC: 11 G/DL
HIV1+2 AB SPEC QL IA.RAPID: NONREACTIVE
IMM GRANULOCYTES NFR BLD AUTO: 0.6 %
LYMPHOCYTES # BLD AUTO: 1.19 K/UL
LYMPHOCYTES NFR BLD AUTO: 16.7 %
MAN DIFF?: NORMAL
MCHC RBC-ENTMCNC: 30.8 PG
MCHC RBC-ENTMCNC: 32.1 GM/DL
MCV RBC AUTO: 96.1 FL
MONOCYTES # BLD AUTO: 0.38 K/UL
MONOCYTES NFR BLD AUTO: 5.3 %
NEUTROPHILS # BLD AUTO: 5.46 K/UL
NEUTROPHILS NFR BLD AUTO: 76.4 %
PLATELET # BLD AUTO: 220 K/UL
RBC # BLD: 3.57 M/UL
RBC # FLD: 13.1 %
T PALLIDUM AB SER QL IA: NEGATIVE
WBC # FLD AUTO: 7.14 K/UL

## 2023-03-23 PROCEDURE — 0502F SUBSEQUENT PRENATAL CARE: CPT

## 2023-03-23 PROCEDURE — 81002 URINALYSIS NONAUTO W/O SCOPE: CPT

## 2023-03-24 ENCOUNTER — APPOINTMENT (OUTPATIENT)
Dept: ANTEPARTUM | Facility: CLINIC | Age: 38
End: 2023-03-24

## 2023-03-28 ENCOUNTER — APPOINTMENT (OUTPATIENT)
Dept: ANTEPARTUM | Facility: CLINIC | Age: 38
End: 2023-03-28
Payer: COMMERCIAL

## 2023-03-28 ENCOUNTER — ASOB RESULT (OUTPATIENT)
Age: 38
End: 2023-03-28

## 2023-03-28 PROCEDURE — 76818 FETAL BIOPHYS PROFILE W/NST: CPT

## 2023-03-30 ENCOUNTER — APPOINTMENT (OUTPATIENT)
Dept: OBGYN | Facility: CLINIC | Age: 38
End: 2023-03-30
Payer: COMMERCIAL

## 2023-03-30 VITALS
WEIGHT: 208 LBS | BODY MASS INDEX: 33.57 KG/M2 | SYSTOLIC BLOOD PRESSURE: 115 MMHG | DIASTOLIC BLOOD PRESSURE: 74 MMHG | HEART RATE: 75 BPM

## 2023-03-30 PROCEDURE — 0502F SUBSEQUENT PRENATAL CARE: CPT

## 2023-03-31 ENCOUNTER — APPOINTMENT (OUTPATIENT)
Dept: ANTEPARTUM | Facility: CLINIC | Age: 38
End: 2023-03-31

## 2023-04-03 ENCOUNTER — APPOINTMENT (OUTPATIENT)
Dept: MATERNAL FETAL MEDICINE | Facility: CLINIC | Age: 38
End: 2023-04-03
Payer: COMMERCIAL

## 2023-04-03 ENCOUNTER — ASOB RESULT (OUTPATIENT)
Age: 38
End: 2023-04-03

## 2023-04-03 VITALS — HEIGHT: 66 IN | WEIGHT: 201 LBS | BODY MASS INDEX: 32.3 KG/M2

## 2023-04-03 PROCEDURE — G0108 DIAB MANAGE TRN  PER INDIV: CPT | Mod: 95

## 2023-04-03 RX ORDER — OXYTOCIN 10 UNIT/ML
333.33 VIAL (ML) INJECTION
Qty: 20 | Refills: 0 | Status: DISCONTINUED | OUTPATIENT
Start: 2023-04-05 | End: 2023-04-07

## 2023-04-03 RX ORDER — SODIUM CHLORIDE 9 MG/ML
1000 INJECTION, SOLUTION INTRAVENOUS
Refills: 0 | Status: DISCONTINUED | OUTPATIENT
Start: 2023-04-05 | End: 2023-04-05

## 2023-04-04 ENCOUNTER — APPOINTMENT (OUTPATIENT)
Dept: ANTEPARTUM | Facility: CLINIC | Age: 38
End: 2023-04-04
Payer: COMMERCIAL

## 2023-04-04 ENCOUNTER — TRANSCRIPTION ENCOUNTER (OUTPATIENT)
Age: 38
End: 2023-04-04

## 2023-04-04 ENCOUNTER — OUTPATIENT (OUTPATIENT)
Dept: OUTPATIENT SERVICES | Facility: HOSPITAL | Age: 38
LOS: 1 days | End: 2023-04-04
Payer: COMMERCIAL

## 2023-04-04 ENCOUNTER — NON-APPOINTMENT (OUTPATIENT)
Age: 38
End: 2023-04-04

## 2023-04-04 ENCOUNTER — ASOB RESULT (OUTPATIENT)
Age: 38
End: 2023-04-04

## 2023-04-04 DIAGNOSIS — Z01.812 ENCOUNTER FOR PREPROCEDURAL LABORATORY EXAMINATION: ICD-10-CM

## 2023-04-04 LAB
BASOPHILS # BLD AUTO: 0.03 K/UL — SIGNIFICANT CHANGE UP (ref 0–0.2)
BASOPHILS NFR BLD AUTO: 0.4 % — SIGNIFICANT CHANGE UP (ref 0–2)
BLD GP AB SCN SERPL QL: SIGNIFICANT CHANGE UP
COMMENT - BLOOD BANK: SIGNIFICANT CHANGE UP
EOSINOPHIL # BLD AUTO: 0.1 K/UL — SIGNIFICANT CHANGE UP (ref 0–0.5)
EOSINOPHIL NFR BLD AUTO: 1.2 % — SIGNIFICANT CHANGE UP (ref 0–6)
HCT VFR BLD CALC: 32.7 % — LOW (ref 34.5–45)
HGB BLD-MCNC: 11.1 G/DL — LOW (ref 11.5–15.5)
IMM GRANULOCYTES NFR BLD AUTO: 1.2 % — HIGH (ref 0–0.9)
LYMPHOCYTES # BLD AUTO: 1.41 K/UL — SIGNIFICANT CHANGE UP (ref 1–3.3)
LYMPHOCYTES # BLD AUTO: 16.5 % — SIGNIFICANT CHANGE UP (ref 13–44)
MCHC RBC-ENTMCNC: 31.2 PG — SIGNIFICANT CHANGE UP (ref 27–34)
MCHC RBC-ENTMCNC: 33.9 GM/DL — SIGNIFICANT CHANGE UP (ref 32–36)
MCV RBC AUTO: 91.9 FL — SIGNIFICANT CHANGE UP (ref 80–100)
MONOCYTES # BLD AUTO: 0.79 K/UL — SIGNIFICANT CHANGE UP (ref 0–0.9)
MONOCYTES NFR BLD AUTO: 9.3 % — SIGNIFICANT CHANGE UP (ref 2–14)
NEUTROPHILS # BLD AUTO: 6.09 K/UL — SIGNIFICANT CHANGE UP (ref 1.8–7.4)
NEUTROPHILS NFR BLD AUTO: 71.4 % — SIGNIFICANT CHANGE UP (ref 43–77)
PLATELET # BLD AUTO: 224 K/UL — SIGNIFICANT CHANGE UP (ref 150–400)
RBC # BLD: 3.56 M/UL — LOW (ref 3.8–5.2)
RBC # FLD: 12.9 % — SIGNIFICANT CHANGE UP (ref 10.3–14.5)
SARS-COV-2 RNA SPEC QL NAA+PROBE: SIGNIFICANT CHANGE UP
WBC # BLD: 8.52 K/UL — SIGNIFICANT CHANGE UP (ref 3.8–10.5)
WBC # FLD AUTO: 8.52 K/UL — SIGNIFICANT CHANGE UP (ref 3.8–10.5)

## 2023-04-04 PROCEDURE — 76816 OB US FOLLOW-UP PER FETUS: CPT

## 2023-04-04 PROCEDURE — 76818 FETAL BIOPHYS PROFILE W/NST: CPT

## 2023-04-04 PROCEDURE — U0003: CPT

## 2023-04-04 PROCEDURE — ZZZZZ: CPT

## 2023-04-04 PROCEDURE — 86850 RBC ANTIBODY SCREEN: CPT

## 2023-04-04 PROCEDURE — 85025 COMPLETE CBC W/AUTO DIFF WBC: CPT

## 2023-04-04 PROCEDURE — 86901 BLOOD TYPING SEROLOGIC RH(D): CPT

## 2023-04-04 PROCEDURE — 36415 COLL VENOUS BLD VENIPUNCTURE: CPT

## 2023-04-04 PROCEDURE — 86900 BLOOD TYPING SEROLOGIC ABO: CPT

## 2023-04-04 PROCEDURE — U0005: CPT

## 2023-04-05 ENCOUNTER — RESULT REVIEW (OUTPATIENT)
Age: 38
End: 2023-04-05

## 2023-04-05 ENCOUNTER — TRANSCRIPTION ENCOUNTER (OUTPATIENT)
Age: 38
End: 2023-04-05

## 2023-04-05 ENCOUNTER — INPATIENT (INPATIENT)
Facility: HOSPITAL | Age: 38
LOS: 1 days | Discharge: HOME | End: 2023-04-07
Attending: OBSTETRICS & GYNECOLOGY | Admitting: OBSTETRICS & GYNECOLOGY
Payer: COMMERCIAL

## 2023-04-05 ENCOUNTER — APPOINTMENT (OUTPATIENT)
Dept: OBGYN | Facility: HOSPITAL | Age: 38
End: 2023-04-05

## 2023-04-05 VITALS — WEIGHT: 201.06 LBS | HEIGHT: 66 IN

## 2023-04-05 DIAGNOSIS — Z98.891 HISTORY OF UTERINE SCAR FROM PREVIOUS SURGERY: Chronic | ICD-10-CM

## 2023-04-05 DIAGNOSIS — O34.211 MATERNAL CARE FOR LOW TRANSVERSE SCAR FROM PREVIOUS CESAREAN DELIVERY: ICD-10-CM

## 2023-04-05 DIAGNOSIS — Z98.890 OTHER SPECIFIED POSTPROCEDURAL STATES: Chronic | ICD-10-CM

## 2023-04-05 LAB
ALBUMIN SERPL ELPH-MCNC: 3.3 G/DL — SIGNIFICANT CHANGE UP (ref 3.3–5.2)
ALP SERPL-CCNC: 197 U/L — HIGH (ref 40–120)
ALT FLD-CCNC: 14 U/L — SIGNIFICANT CHANGE UP
ANION GAP SERPL CALC-SCNC: 12 MMOL/L — SIGNIFICANT CHANGE UP (ref 5–17)
APPEARANCE UR: ABNORMAL
APTT BLD: 26.9 SEC — LOW (ref 27.5–35.5)
AST SERPL-CCNC: 10 U/L — SIGNIFICANT CHANGE UP
BACTERIA # UR AUTO: ABNORMAL
BASOPHILS # BLD AUTO: 0.03 K/UL — SIGNIFICANT CHANGE UP (ref 0–0.2)
BASOPHILS NFR BLD AUTO: 0.3 % — SIGNIFICANT CHANGE UP (ref 0–2)
BILIRUB SERPL-MCNC: 0.2 MG/DL — LOW (ref 0.4–2)
BILIRUB UR-MCNC: NEGATIVE — SIGNIFICANT CHANGE UP
BUN SERPL-MCNC: 8.9 MG/DL — SIGNIFICANT CHANGE UP (ref 8–20)
CALCIUM SERPL-MCNC: 8.4 MG/DL — SIGNIFICANT CHANGE UP (ref 8.4–10.5)
CHLORIDE SERPL-SCNC: 103 MMOL/L — SIGNIFICANT CHANGE UP (ref 96–108)
CO2 SERPL-SCNC: 20 MMOL/L — LOW (ref 22–29)
COLOR SPEC: YELLOW — SIGNIFICANT CHANGE UP
COVID-19 SPIKE DOMAIN AB INTERP: POSITIVE
COVID-19 SPIKE DOMAIN ANTIBODY RESULT: >250 U/ML — HIGH
CREAT ?TM UR-MCNC: 60 MG/DL — SIGNIFICANT CHANGE UP
CREAT SERPL-MCNC: 0.48 MG/DL — LOW (ref 0.5–1.3)
DIFF PNL FLD: NEGATIVE — SIGNIFICANT CHANGE UP
EGFR: 123 ML/MIN/1.73M2 — SIGNIFICANT CHANGE UP
EOSINOPHIL # BLD AUTO: 0.07 K/UL — SIGNIFICANT CHANGE UP (ref 0–0.5)
EOSINOPHIL NFR BLD AUTO: 0.8 % — SIGNIFICANT CHANGE UP (ref 0–6)
EPI CELLS # UR: SIGNIFICANT CHANGE UP
FIBRINOGEN PPP-MCNC: 604 MG/DL — HIGH (ref 200–450)
GLUCOSE SERPL-MCNC: 76 MG/DL — SIGNIFICANT CHANGE UP (ref 70–99)
GLUCOSE UR QL: NEGATIVE MG/DL — SIGNIFICANT CHANGE UP
HCT VFR BLD CALC: 33.8 % — LOW (ref 34.5–45)
HGB BLD-MCNC: 11.4 G/DL — LOW (ref 11.5–15.5)
IMM GRANULOCYTES NFR BLD AUTO: 1 % — HIGH (ref 0–0.9)
INR BLD: 0.95 RATIO — SIGNIFICANT CHANGE UP (ref 0.88–1.16)
KETONES UR-MCNC: NEGATIVE — SIGNIFICANT CHANGE UP
LDH SERPL L TO P-CCNC: 143 U/L — SIGNIFICANT CHANGE UP (ref 98–192)
LEUKOCYTE ESTERASE UR-ACNC: ABNORMAL
LYMPHOCYTES # BLD AUTO: 1.19 K/UL — SIGNIFICANT CHANGE UP (ref 1–3.3)
LYMPHOCYTES # BLD AUTO: 13.6 % — SIGNIFICANT CHANGE UP (ref 13–44)
MCHC RBC-ENTMCNC: 30.7 PG — SIGNIFICANT CHANGE UP (ref 27–34)
MCHC RBC-ENTMCNC: 33.7 GM/DL — SIGNIFICANT CHANGE UP (ref 32–36)
MCV RBC AUTO: 91.1 FL — SIGNIFICANT CHANGE UP (ref 80–100)
MONOCYTES # BLD AUTO: 0.52 K/UL — SIGNIFICANT CHANGE UP (ref 0–0.9)
MONOCYTES NFR BLD AUTO: 6 % — SIGNIFICANT CHANGE UP (ref 2–14)
NEUTROPHILS # BLD AUTO: 6.82 K/UL — SIGNIFICANT CHANGE UP (ref 1.8–7.4)
NEUTROPHILS NFR BLD AUTO: 78.3 % — HIGH (ref 43–77)
NITRITE UR-MCNC: NEGATIVE — SIGNIFICANT CHANGE UP
PH UR: 7 — SIGNIFICANT CHANGE UP (ref 5–8)
PLATELET # BLD AUTO: 214 K/UL — SIGNIFICANT CHANGE UP (ref 150–400)
POTASSIUM SERPL-MCNC: 4.2 MMOL/L — SIGNIFICANT CHANGE UP (ref 3.5–5.3)
POTASSIUM SERPL-SCNC: 4.2 MMOL/L — SIGNIFICANT CHANGE UP (ref 3.5–5.3)
PROT ?TM UR-MCNC: 16 MG/DL — HIGH (ref 0–12)
PROT SERPL-MCNC: 6.6 G/DL — SIGNIFICANT CHANGE UP (ref 6.6–8.7)
PROT UR-MCNC: 15
PROT/CREAT UR-RTO: 0.3 RATIO — HIGH
PROTHROM AB SERPL-ACNC: 11 SEC — SIGNIFICANT CHANGE UP (ref 10.5–13.4)
RBC # BLD: 3.71 M/UL — LOW (ref 3.8–5.2)
RBC # FLD: 13.1 % — SIGNIFICANT CHANGE UP (ref 10.3–14.5)
RBC CASTS # UR COMP ASSIST: NEGATIVE /HPF — SIGNIFICANT CHANGE UP (ref 0–4)
SARS-COV-2 IGG+IGM SERPL QL IA: >250 U/ML — HIGH
SARS-COV-2 IGG+IGM SERPL QL IA: POSITIVE
SODIUM SERPL-SCNC: 135 MMOL/L — SIGNIFICANT CHANGE UP (ref 135–145)
SP GR SPEC: 1.01 — SIGNIFICANT CHANGE UP (ref 1.01–1.02)
T PALLIDUM AB TITR SER: NEGATIVE — SIGNIFICANT CHANGE UP
URATE SERPL-MCNC: 4.3 MG/DL — SIGNIFICANT CHANGE UP (ref 2.4–5.7)
UROBILINOGEN FLD QL: NEGATIVE MG/DL — SIGNIFICANT CHANGE UP
WBC # BLD: 8.72 K/UL — SIGNIFICANT CHANGE UP (ref 3.8–10.5)
WBC # FLD AUTO: 8.72 K/UL — SIGNIFICANT CHANGE UP (ref 3.8–10.5)
WBC UR QL: SIGNIFICANT CHANGE UP /HPF (ref 0–5)

## 2023-04-05 PROCEDURE — 88302 TISSUE EXAM BY PATHOLOGIST: CPT | Mod: 26

## 2023-04-05 PROCEDURE — 59510 CESAREAN DELIVERY: CPT

## 2023-04-05 PROCEDURE — 58925 REMOVAL OF OVARIAN CYST(S): CPT | Mod: 59

## 2023-04-05 PROCEDURE — 88305 TISSUE EXAM BY PATHOLOGIST: CPT | Mod: 26

## 2023-04-05 PROCEDURE — 58700 REMOVAL OF FALLOPIAN TUBE: CPT | Mod: 59

## 2023-04-05 RX ORDER — OXYCODONE HYDROCHLORIDE 5 MG/1
5 TABLET ORAL ONCE
Refills: 0 | Status: DISCONTINUED | OUTPATIENT
Start: 2023-04-05 | End: 2023-04-07

## 2023-04-05 RX ORDER — DIPHENHYDRAMINE HCL 50 MG
25 CAPSULE ORAL EVERY 6 HOURS
Refills: 0 | Status: COMPLETED | OUTPATIENT
Start: 2023-04-05 | End: 2024-03-03

## 2023-04-05 RX ORDER — ACETAMINOPHEN 500 MG
975 TABLET ORAL ONCE
Refills: 0 | Status: COMPLETED | OUTPATIENT
Start: 2023-04-05 | End: 2023-04-05

## 2023-04-05 RX ORDER — DIPHENHYDRAMINE HCL 50 MG
25 CAPSULE ORAL EVERY 6 HOURS
Refills: 0 | Status: DISCONTINUED | OUTPATIENT
Start: 2023-04-05 | End: 2023-04-07

## 2023-04-05 RX ORDER — TETANUS TOXOID, REDUCED DIPHTHERIA TOXOID AND ACELLULAR PERTUSSIS VACCINE, ADSORBED 5; 2.5; 8; 8; 2.5 [IU]/.5ML; [IU]/.5ML; UG/.5ML; UG/.5ML; UG/.5ML
0.5 SUSPENSION INTRAMUSCULAR ONCE
Refills: 0 | Status: DISCONTINUED | OUTPATIENT
Start: 2023-04-05 | End: 2023-04-07

## 2023-04-05 RX ORDER — TRANEXAMIC ACID 100 MG/ML
1000 INJECTION, SOLUTION INTRAVENOUS ONCE
Refills: 0 | Status: COMPLETED | OUTPATIENT
Start: 2023-04-05 | End: 2023-04-05

## 2023-04-05 RX ORDER — SODIUM CHLORIDE 9 MG/ML
1000 INJECTION, SOLUTION INTRAVENOUS ONCE
Refills: 0 | Status: COMPLETED | OUTPATIENT
Start: 2023-04-05 | End: 2023-04-05

## 2023-04-05 RX ORDER — CEFAZOLIN SODIUM 1 G
2000 VIAL (EA) INJECTION ONCE
Refills: 0 | Status: DISCONTINUED | OUTPATIENT
Start: 2023-04-05 | End: 2023-04-05

## 2023-04-05 RX ORDER — MAGNESIUM HYDROXIDE 400 MG/1
30 TABLET, CHEWABLE ORAL
Refills: 0 | Status: DISCONTINUED | OUTPATIENT
Start: 2023-04-05 | End: 2023-04-07

## 2023-04-05 RX ORDER — OXYTOCIN 10 UNIT/ML
333.33 VIAL (ML) INJECTION
Qty: 20 | Refills: 0 | Status: DISCONTINUED | OUTPATIENT
Start: 2023-04-05 | End: 2023-04-07

## 2023-04-05 RX ORDER — SCOPALAMINE 1 MG/3D
1 PATCH, EXTENDED RELEASE TRANSDERMAL ONCE
Refills: 0 | Status: COMPLETED | OUTPATIENT
Start: 2023-04-05 | End: 2023-04-05

## 2023-04-05 RX ORDER — ACETAMINOPHEN 500 MG
975 TABLET ORAL
Refills: 0 | Status: DISCONTINUED | OUTPATIENT
Start: 2023-04-05 | End: 2023-04-07

## 2023-04-05 RX ORDER — KETOROLAC TROMETHAMINE 30 MG/ML
30 SYRINGE (ML) INJECTION EVERY 6 HOURS
Refills: 0 | Status: DISCONTINUED | OUTPATIENT
Start: 2023-04-05 | End: 2023-04-06

## 2023-04-05 RX ORDER — LANOLIN
1 OINTMENT (GRAM) TOPICAL EVERY 6 HOURS
Refills: 0 | Status: DISCONTINUED | OUTPATIENT
Start: 2023-04-05 | End: 2023-04-07

## 2023-04-05 RX ORDER — SIMETHICONE 80 MG/1
80 TABLET, CHEWABLE ORAL EVERY 4 HOURS
Refills: 0 | Status: DISCONTINUED | OUTPATIENT
Start: 2023-04-05 | End: 2023-04-07

## 2023-04-05 RX ORDER — CEFAZOLIN SODIUM 1 G
2000 VIAL (EA) INJECTION ONCE
Refills: 0 | Status: COMPLETED | OUTPATIENT
Start: 2023-04-05 | End: 2023-04-05

## 2023-04-05 RX ORDER — FAMOTIDINE 10 MG/ML
20 INJECTION INTRAVENOUS ONCE
Refills: 0 | Status: COMPLETED | OUTPATIENT
Start: 2023-04-05 | End: 2023-04-05

## 2023-04-05 RX ORDER — ENOXAPARIN SODIUM 100 MG/ML
60 INJECTION SUBCUTANEOUS EVERY 24 HOURS
Refills: 0 | Status: DISCONTINUED | OUTPATIENT
Start: 2023-04-05 | End: 2023-04-07

## 2023-04-05 RX ORDER — SODIUM CHLORIDE 9 MG/ML
1000 INJECTION, SOLUTION INTRAVENOUS
Refills: 0 | Status: DISCONTINUED | OUTPATIENT
Start: 2023-04-05 | End: 2023-04-07

## 2023-04-05 RX ORDER — CITRIC ACID/SODIUM CITRATE 300-500 MG
30 SOLUTION, ORAL ORAL ONCE
Refills: 0 | Status: COMPLETED | OUTPATIENT
Start: 2023-04-05 | End: 2023-04-05

## 2023-04-05 RX ORDER — LABETALOL HCL 100 MG
1 TABLET ORAL
Refills: 0 | DISCHARGE

## 2023-04-05 RX ORDER — OXYCODONE HYDROCHLORIDE 5 MG/1
5 TABLET ORAL
Refills: 0 | Status: DISCONTINUED | OUTPATIENT
Start: 2023-04-05 | End: 2023-04-07

## 2023-04-05 RX ORDER — IBUPROFEN 200 MG
600 TABLET ORAL EVERY 6 HOURS
Refills: 0 | Status: COMPLETED | OUTPATIENT
Start: 2023-04-05 | End: 2024-03-03

## 2023-04-05 RX ADMIN — Medication 1000 MILLIUNIT(S)/MIN: at 12:20

## 2023-04-05 RX ADMIN — SCOPALAMINE 1 PATCH: 1 PATCH, EXTENDED RELEASE TRANSDERMAL at 19:00

## 2023-04-05 RX ADMIN — SCOPALAMINE 1 PATCH: 1 PATCH, EXTENDED RELEASE TRANSDERMAL at 09:50

## 2023-04-05 RX ADMIN — Medication 30 MILLIGRAM(S): at 23:53

## 2023-04-05 RX ADMIN — Medication 975 MILLIGRAM(S): at 10:28

## 2023-04-05 RX ADMIN — Medication 975 MILLIGRAM(S): at 21:26

## 2023-04-05 RX ADMIN — Medication 30 MILLILITER(S): at 10:28

## 2023-04-05 RX ADMIN — Medication 2000 MILLIGRAM(S): at 11:22

## 2023-04-05 RX ADMIN — Medication 975 MILLIGRAM(S): at 10:58

## 2023-04-05 RX ADMIN — FAMOTIDINE 20 MILLIGRAM(S): 10 INJECTION INTRAVENOUS at 10:28

## 2023-04-05 RX ADMIN — Medication 30 MILLIGRAM(S): at 18:05

## 2023-04-05 RX ADMIN — TRANEXAMIC ACID 220 MILLIGRAM(S): 100 INJECTION, SOLUTION INTRAVENOUS at 11:15

## 2023-04-05 RX ADMIN — SODIUM CHLORIDE 2000 MILLILITER(S): 9 INJECTION, SOLUTION INTRAVENOUS at 10:10

## 2023-04-05 RX ADMIN — ENOXAPARIN SODIUM 60 MILLIGRAM(S): 100 INJECTION SUBCUTANEOUS at 21:23

## 2023-04-05 NOTE — OB RN DELIVERY SUMMARY - NS_RESUSCITPROC_OBGYN_ALL_OB
[facility] - Oncology ([facility])  Hematology  Bone Marrow Transplant  Progress Note    Patient Name: Deepti Gonzalez  Admission Date: 5/23/2022  Hospital Length of Stay: 2 days  Code Status: Full Code    Subjective:     Interval History: Day 2 of vincristine. Tolerated dose 5/24 without issue. WBC back up to 129K, will increase dex to 40mg daily. Concern for impending DIC with decreased fibrinogen to 123, will monitor BID DIC labs in addition to TLS labs. Patient weight up from admit, no edema, however reports SOB and using O2 via NC. Will give 40mg lasix. Blood sugars increased, added 5u aspart w/ meals. Afebrile, VSS    Objective:     Vital Signs (Most Recent):  Temp: 98.1 °F (36.7 °C) (05/25/22 1208)  Pulse: 84 (05/25/22 1208)  Resp: 19 (05/25/22 1208)  BP: 135/72 (05/25/22 1208)  SpO2: 95 % (05/25/22 1208) Vital Signs (24h Range):  Temp:  [97.2 °F (36.2 °C)-98.1 °F (36.7 °C)] 98.1 °F (36.7 °C)  Pulse:  [81-97] 84  Resp:  [16-19] 19  SpO2:  [92 %-96 %] 95 %  BP: (123-147)/(65-75) 135/72     Weight: 73.7 kg (162 lb 7.7 oz)  Body mass index is 27.04 kg/m².  Body surface area is 1.84 meters squared.      Intake/Output - Last 3 Shifts         05/23 0700  05/24 0659 05/24 0700 05/25 0659 05/25 0700 05/26 0659    P.O.  500     I.V. (mL/kg)  830.5 (11.3) 225 (3.1)    Blood   215    IV Piggyback  21.8     Total Intake(mL/kg)  1352.3 (18.3) 440 (6)    Urine (mL/kg/hr) 300 1900 (1.1) 600 (1.5)    Total Output 300 1900 600    Net -300 -547.7 -160           Urine Occurrence 2 x 2 x             Physical Exam  Vitals reviewed.   Constitutional:       Appearance: Normal appearance. She is well-developed.   HENT:      Head: Normocephalic and atraumatic.      Right Ear: External ear normal.      Left Ear: External ear normal.   Eyes:      Extraocular Movements: Extraocular movements intact.      Conjunctiva/sclera: Conjunctivae normal.   Cardiovascular:      Rate and Rhythm: Normal rate and regular rhythm.      Pulses: Normal  pulses.      Heart sounds: Normal heart sounds. No murmur heard.  Pulmonary:      Effort: Pulmonary effort is normal. No respiratory distress.      Breath sounds: No stridor. Wheezing present. No rales.   Abdominal:      General: Bowel sounds are normal. There is no distension.      Palpations: Abdomen is soft.      Tenderness: There is no abdominal tenderness.   Musculoskeletal:         General: Normal range of motion.      Cervical back: Normal range of motion.      Right lower leg: No edema.      Left lower leg: No edema.   Skin:     General: Skin is warm and dry.      Findings: Bruising present. No rash.      Comments: RCWP with no signs of infection   Neurological:      General: No focal deficit present.      Mental Status: She is alert and oriented to person, place, and time.   Psychiatric:         Mood and Affect: Mood normal.         Behavior: Behavior normal.         Thought Content: Thought content normal.         Judgment: Judgment normal.       Significant Labs:   CBC:   Recent Labs   Lab 05/23/22  1238 05/24/22  0501 05/25/22  0439   .50* 111.80* 129.30*   HGB 10.1* 8.9* 8.1*   HCT 28.6* 26.1* 24.2*   PLT 15* 15* 8*      and CMP:   Recent Labs   Lab 05/24/22  0501 05/24/22  1611 05/25/22  0439    137 137   K 4.2 4.5 4.4    103 104   CO2 22* 22* 18*   * 237* 274*   BUN 10 12 19   CREATININE 0.7 0.8 0.7   CALCIUM 8.6* 8.4* 8.4*   PROT 6.2 6.0 6.3   ALBUMIN 3.3* 3.3* 3.4*   BILITOT 0.4 0.4 0.3   ALKPHOS 494* 433* 412*   AST 41* 37 59*   ALT 72* 64* 62*   ANIONGAP 12 12 15   EGFRNONAA >60.0 >60.0 >60.0         Diagnostic Results:  I have reviewed all pertinent imaging results/findings within the past 24 hours.    Assessment/Plan:     * Leukocytosis  - seen in BMT clinic 5/23 by Dr. 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 Blincyto held & given steroids x 3 days. Resumed Blincyto 5/17 and again developed CRS, Grade 2 as well as mild AMS and Grade 3 transaminitis. Blincyto stopped with no plan to resume. Discharged on 5/19  - seen today 5/23 by Dr. Jenkins in BMT clinic for follow up to discuss next plan with WBC 136K  - admitted for cytoreduction and dose of vincristine as bridge to inotuzumab + ponatinib  - will start 20mg daily dex, increased to 40mg on 5/24  - s/p 2mg vincristine x1 on 5/24  - also newly developed T315I bcr-abl resistance mutation; ponatinib Rx pending via specialty pharmacy  - continue ppx acyclovir, fluconazole, levaquin and bactrim    At high risk of tumor lysis syndrome  - given leukocytosis with WBC 136K and plan for rapid reduction with PO dex and 1 dose of vincristine, patient at high risk for TLS  - on IVF @75  - start allopurinol 300mg BID  - monitor TLS labs BID    Coagulopathy  - high risk with cytoreduction/refractory B-ALL s/p vincristine on 5/24  - monitoring BID INR/fibrinogen  - fib this ; plan for cryo if <100    Cancer associated pain  - worsening pain to left hip with worsening leukocytosis  - denies trauma or injury to area  - likely d/t leukemia  - oxycodone has been unsuccessful  - reports improvement with 0.5mg dilaudid q6h prn    Anemia in neoplastic disease  - daily CBC while inpatient  - transfuse for Hgb <7    Thrombocytopenia  - daily CBC while inpatient  - transfuse for Plt <10K or bleeding    Volume  overload  - weight up ~9lbs from admit today  - on high rate IVF d/t TLS risk  - on 1-2L O2 via NC, reporting intermittent SOB  - mild wheezing noted on exam  - will give 40mg lasix x1    Transaminitis  - significant transaminitis following Blinatumomab  - closely monitor with cytoreduction and vincristine  - slightly increased ALT/AST today following vincristine    Moderate major depression  - Continue home trintellix    Anxiety  - Continue home trintellix and prn valium    GERD (gastroesophageal reflux disease)  - hold home omeprazole, switch to protonix while inpatient    Hypertension  - continue home losartan    Seizure disorder  - continue home oxcarbazepine    Type 2 diabetes mellitus, without long-term current use of insulin  - hold home farxiga while inpatient; can resume at discharge.  - achs blood glucose monitoring, moderate SSI, and diabetic diet while admitted  - added 5units aspart w/ meals after increasing dex dose  - will likely have hyperglycemia with steroids, consider endocrine consult if plan for long term steroids    Rheumatoid arthritis involving multiple sites  - not currently on any active tx    History of TIA (transient ischemic attack)  - Holding home statin while inpatient  - Holding home ASA while inpatient due to thrombocytopenia. Will hold ASA at discharge. Can be resumed outpatient as appropriate when platelets >50K    Hyperlipidemia  - holding home statin while inpatient. Can resume at discharge if LFTs normalize.    Paroxysmal atrial fibrillation  - continue home diltiazem  - Holding xarelto with plt count < 50K. Can resume outpatient when appropriate.        VTE Risk Mitigation (From admission, onward)         Ordered     heparin, porcine (PF) 100 unit/mL injection flush 300 Units  As needed (PRN)         05/24/22 1021     IP VTE HIGH RISK PATIENT  Once         05/23/22 1552     Place sequential compression device  Until discontinued         05/23/22 1552     Reason for No  Pharmacological VTE Prophylaxis  Once        Question:  Reasons:  Answer:  Thrombocytopenia    05/23/22 4691                Disposition: Remains inpatient    Shanta Love NP  Bone Marrow Transplant  [facility] - Oncology ([facility])       Nasal Suction/Blow by O2/CPAP/Tactile Stimulation/Pulse Oximetry

## 2023-04-05 NOTE — DISCHARGE NOTE OB - PATIENT PORTAL LINK FT
You can access the FollowMyHealth Patient Portal offered by [facility] by registering at the following website: [email or web address]. By joining [employer]’s FollowMyHealth portal, you will also be able to view your health information using other applications (apps) compatible with our system.

## 2023-04-05 NOTE — OB NEONATOLOGY/PEDIATRICIAN DELIVERY SUMMARY - NSCSDELIVATYPE_OBGYN_ALL_OB
"Subjective   Cody Eldridge is a 74 y.o. male.     error       /77   Pulse 101   Temp 96.3 °F (35.7 °C) (Tympanic)   Ht 177.8 cm (70\")   Wt 72.6 kg (160 lb)   SpO2 97%   BMI 22.96 kg/m²       The following portions of the patient's history were reviewed and updated as appropriate: allergies, current medications, past family history, past medical history, past social history, past surgical history and problem list.    Review of Systems    Objective   Physical Exam      Assessment/Plan   Cody was seen today for leg pain bilateral.    Diagnoses and all orders for this visit:    ERRONEOUS ENCOUNTER--DISREGARD                  There are no Patient Instructions on file for this visit.           "
error  
Repeat

## 2023-04-05 NOTE — DISCHARGE NOTE OB - HOSPITAL COURSE
delivered via  section. Also underwent bilateral salpingectomy. She was transferred to postpartum unit without complications during her stay. Upon discharge she is voiding, tolerating PO, ambulating, and pain is controlled.

## 2023-04-05 NOTE — OB RN PREOPERATIVE CHECKLIST - LOOSE TEETH
GENERAL SURGERY PROGRESS NOTE    CHIEF COMPLAINT: abdominal pain with vomiting    Visit completed with assistance of  Comfort #[id number]    SUBJECTIVE: Patient resting in bed, notes he is doing well. Tolerated clears without nausea. Notes he had multiple BM yesterday and no abdominal pain.       OBJECTIVE:     Vital Last Value 24 Hour Range   Temperature 98.1 °F (36.7 °C) (12/13/22 0840) Temp  Min: 98.1 °F (36.7 °C)  Max: 98.7 °F (37.1 °C)   Pulse (!) 45 (nurse is aware) (12/13/22 0840) Pulse  Min: 45  Max: 55   Respiratory 16 (12/13/22 0840) Resp  Min: 16  Max: 16   Non-Invasive  Blood Pressure 134/63 (12/13/22 0840) BP  Min: 134/63  Max: 141/63   Pulse Oximetry 99 % (12/13/22 0840) SpO2  Min: 98 %  Max: 99 %   Arterial   Blood Pressure   No data recorded         Recent Weights:  Weight    12/09/22 0848 12/10/22 0818 12/11/22 0840 12/12/22 0833   Weight: 81.4 kg (179 lb 7.3 oz) 81.3 kg (179 lb 3.7 oz) 81.5 kg (179 lb 10.8 oz) 81.8 kg (180 lb 4.8 oz)       General: Alert, awake, in no acute distress.  Abdomen: Soft, nondistended, nontender, no masses. Multiple well healed abdominal scars   Psych: Normal affect, normal interaction.      Last Stool Occurrence:  1 (12/12/22 0721)    Laboratory Results:  Recent Labs   Lab 12/13/22  0548 12/12/22 0419 12/11/22  0552   WBC 5.1 8.6 6.4   RBC 3.84* 4.66 4.01*   HCT 32.6* 37.9* 33.6*   HGB 11.0* 13.2 11.5*   * 149 99*     Recent Labs   Lab 12/13/22  0548 12/12/22  1818 12/12/22 0419 12/11/22  1713 12/11/22  0552   SODIUM 141  --  138  --  139   POTASSIUM 3.5 3.4 3.5   < > 3.3*   CHLORIDE 108  --  102  --  107   CO2 27  --  24  --  25   GLUCOSE 157*  --  198*  --  179*   BUN 23*  --  20  --  11   CREATININE 0.86  --  0.85  --  0.84   CALCIUM 9.2  --  9.8  --  8.7   ALBUMIN 3.1*  --  3.5*  --  3.1*   MG 2.5*  --  2.2  --  1.9   BILIRUBIN 0.5  --  0.8  --  0.9   ALKPT 81  --  108  --  85   AST 12  --  23  --  17   GPT 34  --  42  --  29   BCRAT 27*  --  24   --  13    < > = values in this interval not displayed.       Imaging:  KUB 12/12:  IMPRESSION:  Mild gaseous distention of small bowel loops within the left hemiabdomen  are nonspecific and could represent residual partial obstruction. Contrast  is present throughout the colon and within the rectum.    IMPRESSION:  -- 66 year old male admitted with recurrent small bowel obstruction. CT completed and revealed small bowel obstruction with a area of fecalization  -- History of multiple abdominal surgeries (see HPI), lastly  robotic abdominal wall reconstruction with closure of large midline defect, extensive lysis of adhesions 10/4/22   -- History renal cancer post radical nephrectomy 2015  -- Additional diagnoses: hypertension, hyperlipidemia, diabetes.        PLAN:  --No emergent surgical surgery at this time, will continue to follow.   --will do fulls for lunch. If tolerates will advance to trans for dinner. Can DC TPN after this bag.  --Encourage ambulation  --PRN pain medications  --Medical management per attending     Ana Rosa Cabrera PA-C  Surgical specialties float APC  Supervising MD : Elicia Camp MD   no

## 2023-04-05 NOTE — OB RN INTRAOPERATIVE NOTE - NSSELHIDDEN_OBGYN_ALL_OB_FT
[NS_DeliveryAttending1_OBGYN_ALL_OB_FT:[id number]],[NS_DeliveryAssist1_OBGYN_ALL_OB_FT:[id number]],[NS_DeliveryRN_OBGYN_ALL_OB_FT:[id number]]

## 2023-04-05 NOTE — OB PROVIDER DELIVERY SUMMARY - NSOBVTERISKASSESS_OBGYN_ALL_OB_FT
Physician Pre-Sedation Assessment    Pre-Sedation Assessment:    Sedation History: Previous Sedation with No Complications and Airway Assessed    Cardiac: normal S1, S2  Respiratory: breath sounds clear bilaterally   Abdomen: soft, BS (+), non-tender    AS
OBPostPartum Assessment Completed on: 05-Apr-2023 13:05

## 2023-04-05 NOTE — OB RN INTRAOPERATIVE NOTE - NSDELIVPROC_OBGYN_ALL_OB
Quality 110: Preventive Care And Screening: Influenza Immunization: Influenza Immunization previously received during influenza season
Detail Level: Detailed
 Section with Postpartum Sterilization

## 2023-04-05 NOTE — DISCHARGE NOTE OB - MEDICATION SUMMARY - MEDICATIONS TO TAKE
I will START or STAY ON the medications listed below when I get home from the hospital:    acetaminophen 325 mg oral tablet  -- 3 tab(s) by mouth every 6 hours  -- Indication: For as needed for pain    ibuprofen 600 mg oral tablet  -- 1 tab(s) by mouth every 6 hours  -- Indication: For as needed for pain    labetalol 100 mg oral tablet  -- 1 by mouth 2 times a day  -- Indication: For Continue home meds

## 2023-04-05 NOTE — OB PROVIDER H&P - ASSESSMENT
Patient is a 37 year old  at 39w1d by LMP/US who presents to L&D for rCS+BS.   - Admit to L&D  - Consent  - Admission Labs  - IV fluids   - Continuous toco and fetal monitoring   - DVT prophylaxis  - pre-op abx    Discussed with Dr. Stewart

## 2023-04-05 NOTE — OB PROVIDER DELIVERY SUMMARY - NSPROVIDERDELIVERYNOTE_OBGYN_ALL_OB_FT
RLFT c/s  Bilateral Tubal excison  Left Ovarian Bx / Cystectomy  Normal Tubes Bilateral,  Normal Right Ovary,, Left Ovary 0.5 cm cystic mass Dermoid vs corpus luteum    Female @ 11:47  Apgar     Weight 6 lbs 13 oz.  Dr. Simpson present

## 2023-04-05 NOTE — OB RN INTRAOPERATIVE NOTE - NS_DELIVERYCRNA_OBGYN_ALL_OB_FT
Per Urology pt able to have PO, ice water given, pt requested IV to be removed. awaiting discharge papers.      Cosmo Mcdaniel RN  05/20/20 4[address] [facility]

## 2023-04-05 NOTE — OB NEONATOLOGY/PEDIATRICIAN DELIVERY SUMMARY - NSPEDSNEONOTESA_OBGYN_ALL_OB_FT
Dr. Stewart requested me to evaluate 8 min old BG born via C/S at 39 weeks for O2 desaturation and poor color.  The mom is 38 y/o, , PNL WNL, B+  L & D: baby was suctioned and stimulated, and had PPV X 20 sec, became vigorous, pink and perfuse, O2 Sat improved to 96%  Asst: full term appropriate for gestational age BG, C/S with poor transition, improved  Plan: observe in transition stable admit to NBN

## 2023-04-05 NOTE — DISCHARGE NOTE OB - BIRTH CERTIFICATE COMPLETED
Transplant consultation Cherelle Martinez met with Megan Mederos and her family  to discuss the preliminary information about autologous stem cell transplantation. The plan will be Neupogen +/- Mozobil mobilization and followed by High dose chemotherapy and autologous stem cell transplant. We reviewed  1. The overview of services provided at [facility] and the [place] [id number]. 2. The transplant TEAM members including the 4 transplant physicians and the monthly rotation schedule. 3. The fundamentals of stem cells, the types of stem cell transplants and their mechanism to treat disease. 4. The stem cell evaluation process, the mobilization process, the high dose chemotherapy / stem cell transplant and the recovery process. I have reviewed and given them the transplant process outline and the autologous transplant patients booklet. Additionally we toured the outpatient and inpatient unit. They have given me permission to contact their insurance company to verify insurance coverage for transplant at [facility] [employer]..  I have answered their question and they will return 12/26/18 for a PET scan and 12/31/18 for pre transplant testing. They have verbalized understanding and agree to the plan.   Pily Atkinson RN BSN ZARA [address]  Transplant coordinator  [phone]  [id number]  Cycle 5 Day 8 27  4 pm PET scan [facility]   1st floor Radiation Oncology Department  Prep- Water only after 11 am  28 29   30    * Collect 24- hour urine sample 31  * Return Urine Jug  ** Bring medicine bottles for Review    9:30 am [facility] office Pily Atkinson  Pre Transplant labs & MM Labs    10:30 am [facility] [employer] Testing    2:15 pm [facility] office      Dr. Frankey Lame BM Biopsy       JANUARY 1    Medical Insurance changes to                 Medical [place]  Need to be Verified and permission to complete evaluation 2  Cycle 5 Day 15 3 4 5
Yes

## 2023-04-05 NOTE — DISCHARGE NOTE OB - PLAN OF CARE
Assessment and Plan of Treatment: Please call your provider to schedule postoperative wound check visit in 1-2 weeks. Take medications as directed, regular diet, activity as tolerated. Exclusive breast feeding for the first 6 months is recommended. Nothing per vagina for 6 weeks (incl. sex, douching, etc). If you have additional concerns, please inform your provider. As above

## 2023-04-05 NOTE — OB RN DELIVERY SUMMARY - NSSELHIDDEN_OBGYN_ALL_OB_FT
[NS_DeliveryAttending1_OBGYN_ALL_OB_FT:[id number]],[NS_DeliveryAssist1_OBGYN_ALL_OB_FT:[id number]],[NS_DeliveryRN_OBGYN_ALL_OB_FT:[id number]==]

## 2023-04-05 NOTE — DISCHARGE NOTE OB - CARE PROVIDER_API CALL
Wyatt Stewart)  Obstetrics and Gynecology  [address] [place] [address]  Phone: [phone]  Fax: [phone]  Established Patient  Follow Up Time: 2 weeks

## 2023-04-05 NOTE — OB PROVIDER H&P - NS_SCHEDCS_OBGYN_ALL_OB
Prior  Double O-Z Flap Text: The defect edges were debeveled with a #15 scalpel blade.  Given the location of the defect, shape of the defect and the proximity to free margins a Double O-Z flap was deemed most appropriate.  Using a sterile surgical marker, an appropriate transposition flap was drawn incorporating the defect and placing the expected incisions within the relaxed skin tension lines where possible. The area thus outlined was incised deep to adipose tissue with a #15 scalpel blade.  The skin margins were undermined to an appropriate distance in all directions utilizing iris scissors.

## 2023-04-05 NOTE — OB PROVIDER H&P - NSHPPHYSICALEXAM_GEN_ALL_CORE
PE:  General: NAD  Lungs: Normal respiratory effort   Abdomen: nontender, gravid   Extremities: No dvt signs Vital Signs Last 24 Hrs  HR: 75 (05 Apr 2023 10:06) (75 - 75)  BP: 124/79 (05 Apr 2023 10:06) (124/79 - 124/79)    PE:  General: NAD  Lungs: Normal respiratory effort   Abdomen: nontender, gravid   Extremities: No dvt signs    FHT: 130bpm baseline, moderate variability, + accelerations, no decelerations  [place]: infrequent, irregular

## 2023-04-05 NOTE — OB RN DELIVERY SUMMARY - NS_GENERALBABYACOMMENTA_OBGYN_ALL_OB_FT
MD Portillo called to bedside for  assessment at 1156. MD Portillo at bedside at 1157 assessing infant.

## 2023-04-05 NOTE — OB RN DELIVERY SUMMARY - NS_SEPSISRSKCALC_OBGYN_ALL_OB_FT
EOS calculated successfully. EOS Risk Factor: 0.5/1000 live births ([facility] national incidence); GA=39w;Temp=97.88; ROM=0.033; GBS='Negative'; Antibiotics='No antibiotics or any antibiotics < 2 hrs prior to birth'

## 2023-04-05 NOTE — OB PROVIDER H&P - HISTORY OF PRESENT ILLNESS
Patient is a 37 year old  at 39w1d by LMP/US who presents to L&D for rCS+BS. She denies any ctx, LOF, VB and reports good fetal movement     LAST: 23   LMP: 22     Pregnancy course:   CHTN   GDMA1   AMA   Fetal hydronephrosis        Obhx: CSx2 ,    Gynhx:+ fibroids, +HPV   Pmhx: HTN   Pshx: CSx2   Meds: lab 100 BID, pnv, asa   Allergies: nkda   Social Hx: Denies x3       BMI: 32.28   Ultrasound: 3/30 vertex, posterior   EFW: 2434 3/14

## 2023-04-05 NOTE — OB RN PATIENT PROFILE - FALL HARM RISK - UNIVERSAL INTERVENTIONS
Bed in lowest position, wheels locked, appropriate side rails in place/Call bell, personal items and telephone in reach/Instruct patient to call for assistance before getting out of bed or chair/Non-slip footwear when patient is out of bed/[place] to call system/Physically safe environment - no spills, clutter or unnecessary equipment/Purposeful Proactive Rounding/Room/bathroom lighting operational, light cord in reach

## 2023-04-05 NOTE — OB PROVIDER H&P - NSLOWPPHRISK_OBGYN_A_OB
[place] Pregnancy/Less than or equal to 4 previous vaginal births/No known bleeding disorder/No history of postpartum hemorrhage/No other PPH risks indicated

## 2023-04-05 NOTE — DISCHARGE NOTE OB - NS MD DC FALL RISK RISK
For information on Fall & Injury Prevention, visit: [email or web address] OR  [email or web address] OR  https://www.cdc.gov/steadi/patient.html

## 2023-04-05 NOTE — OB PROVIDER DELIVERY SUMMARY - NSSELHIDDEN_OBGYN_ALL_OB_FT
[NS_DeliveryAttending1_OBGYN_ALL_OB_FT:[id number]==],[NS_DeliveryAssist1_OBGYN_ALL_OB_FT:[id number]],[NS_DeliveryRN_OBGYN_ALL_OB_FT:[id number]==]

## 2023-04-05 NOTE — OB RN INTRAOPERATIVE NOTE - NS_POSTSURGSKIN_OBGYN_ALL_OB
General Adult HPI





- General


Chief complaint: Shortness of Breath


Stated complaint: DWAINE


Time Seen by Provider: 18 02:00


Source: patient, EMS, RN notes reviewed


Mode of arrival: EMS


Limitations: physical limitation





- History of Present Illness


Initial comments: 





This is a 76-year-old male who presents emergency Department complaining of 

difficulty breathing and coughing up blood.  Patient states he had open heart 

surgery 3 weeks ago.  Patient states the hemoptysis started this evening.  

Patient states she was supposed to see Dr. Madrigal today to get his lung drained 

of fluid.  Patient denies any fever or chills.  Patient denies any new chest 

pain.  Patient denies any lightheadedness or dizziness.  Patient denies 

abdominal pain.  Patient denies any leg swelling or calf tenderness.  Patient 

states she was on Coumadin up until Saturday.





- Related Data


 Home Medications











 Medication  Instructions  Recorded  Confirmed


 


Gabapentin [Neurontin] 100 mg PO TID 18


 


Ipratropium-Albuterol Nebulize 3 ml INHALATION RT-QID 18





[Duoneb 0.5 mg-3 mg/3 ml Soln]   


 


Tamsulosin HCl [Flomax] 0.4 mg PO DAILY 18








 Previous Rx's











 Medication  Instructions  Recorded


 


Acetaminophen Tab [Tylenol] 500 mg PO Q4HR PRN  tab 18


 


Aspirin 325 mg PO DAILY  tab 18


 


Atorvastatin [Lipitor] 40 mg PO DAILY  tab 18


 


Budesonide-Formot 160-4.5 Mcg 2 puff INHALATION RT-BID  puff 18





[Symbicort 160-4.5 Mcg Inhaler]  


 


Clopidogrel [Plavix] 75 mg PO DAILY #0 tab 18


 


Fondaparinux [Arixtra] 2.5 mg SQ DAILY  syringe 18


 


Glimepiride [Amaryl] 1 mg PO AC-BRKFST  tab 18


 


Insulin Aspart [NovoLOG 0 unit SQ ACHS  vial 18





(formulary)]  


 


Ipratropium-Albuterol Nebulize 3 ml INHALATION RT-Q2H PRN 18





[Duoneb 0.5 mg-3 mg/3 ml Soln] ampul.neb 


 


Levofloxacin [Levaquin] 750 mg PO DAILY #7 tab 18


 


Lisinopril [Zestril] 2.5 mg PO DAILY@1200  tab 18


 


Magnesium Hydroxide [Milk of 2,400 mg PO BID PRN  ml 18





Magnesia Concentrate]  


 


Melatonin 2 mg PO HS  tab 18


 


Metoprolol Succinate (ER) [Toprol 50 mg PO BID  tab.er.24h 18





XL]  


 


Pantoprazole [Protonix] 40 mg PO AC-BRKFST  tablet. 18


 


Warfarin [Coumadin] 5 mg PO DAILY #0 18


 


predniSONE 10 mg PO DAILY  tab 18











 Allergies











Allergy/AdvReac Type Severity Reaction Status Date / Time


 


inhaler AdvReac  Rapid Uncoded 18 15:04





   Heart Rate  














Review of Systems


ROS Statement: 


Those systems with pertinent positive or pertinent negative responses have been 

documented in the HPI.





ROS Other: All systems not noted in ROS Statement are negative.





Past Medical History


Past Medical History: Atrial Fibrillation, Chest Pain / Angina, Heart Failure, 

COPD, Diabetes Mellitus, Deep Vein Thrombosis (DVT), Eye Disorder, GERD/Reflux, 

GI Bleed, Hypertension, Myocardial Infarction (non Q-wave), Osteoarthritis (OA)

, Pneumonia, Prostate Disorder, Respiratory Disorder, Sleep Apnea/CPAP/BIPAP, 

Vascular Disorder


Additional Past Medical History / Comment(s): pt is rt side dominant. lumbar 

disc disease, history of cpap for obstructive sleep apnea-no longer. pt stated 

he wa born with an irreg heart beat, home 02 3 liters nc at hs. "pt stated may 

have had a mi or tia they were'nt sure".  History of sick sinus syndrome.


History of Any Multi-Drug Resistant Organisms: MRSA


Date of last positivie culture/infection: 2000


MDRO Source:: Left Third Finger/ rt foot


Past Surgical History: Appendectomy, Back Surgery, Cholecystectomy, Heart 

Catheterization, Orthopedic Surgery, Pacemaker


Additional Past Surgical History / Comment(s): andrea cataracts, had part of 

stomach removed and a foot of bowel d/t bleeding peptic ulcers. total lt knee 

replacment,several sx on andrea feet had 4th toe each foot removed.rt hand 2nd 

finger partial amp and 3 rd finger amputated,


Past Anesthesia/Blood Transfusion Reactions: No Reported Reaction


Additional Past Anesthesia/Blood Transfusion Reaction / Comment(s): blood 

transfusion- no reaction


Type of Cardiac Device: Permanent Pacemaker


Device Placement Date:: unk


Past Psychological History: No Psychological Hx Reported


Smoking Status: Former smoker


Past Alcohol Use History: None Reported


Past Drug Use History: None Reported





- Past Family History


  ** Mother


Family Medical History: Cancer


Additional Family Medical History / Comment(s):  age 52





  ** Father


Family Medical History: Cancer


Additional Family Medical History / Comment(s): rectal cancer-  age 75





  ** Brother(s)


Family Medical History: Coronary Artery Disease (CAD), Myocardial Infarction (MI

)


Additional Family Medical History / Comment(s): History of myocardial 

infarction at age 46.





General Exam





- General Exam Comments


Initial Comments: 





GENERAL:


Patient is well-developed and well-nourished.  Patient is nontoxic and well-

hydrated and is in mild distress.





ENT:


Neck is soft and supple.  No significant lymphadenopathy is noted.  Oropharynx 

is clear.  Moist mucous membranes.  





EYES:


The sclera were anicteric and conjunctiva were pink and moist.  Extraocular 

movements were intact and pupils were equal round and reactive to light.  

Eyelids were unremarkable.





PULMONARY:


Diminished breath sounds with some rhonchi in the left base





CARDIOVASCULAR:


There is a regular rate and rhythm without any murmurs gallops or rubs. 





ABDOMEN:


Soft and nontender with normal bowel sounds.  No palpable organomegaly was 

noted.  There is no palpable pulsatile mass.





SKIN:


Skin is clear with no lesions or rashes and otherwise unremarkable.





NEUROLOGIC:


Patient is alert and oriented x3.  Cranial nerves II through XII are grossly 

intact.  Motor and sensory are also intact.  Normal speech, volume and content.

  Symmetrical smile.  





MUSCULOSKELETAL:


Normal extremities with adequate strength and full range of motion.  No lower 

extremity swelling or edema.  No calf tenderness.





LYMPHATICS:


No significant lymphadenopathy is noted





PSYCHIATRIC:


Normal psychiatric evaluation.  Normal interpersonal interactions appears 

functionally intact in deals appropriately with others.  No signs of 

depression.  No signs of anxiety. 


Limitations: physical limitation





Course


 Vital Signs











  18





  02:13 03:43 04:06


 


Temperature 98.1 F  


 


Pulse Rate 112 H 110 H 112 H


 


Respiratory 20 20 





Rate   


 


Blood Pressure 116/69 108/60 


 


O2 Sat by Pulse 97 99 





Oximetry   














  18





  04:16 04:45


 


Temperature  


 


Pulse Rate 115 H 104 H


 


Respiratory  19





Rate  


 


Blood Pressure  124/62


 


O2 Sat by Pulse  100





Oximetry  














Medical Decision Making





- Medical Decision Making





EKG shows atrial fibrillation at a rate of 116 bpm QRS is 100 a QT interval 348 

QTC is 483.  EKG is of poor quality secondary to the patient's dyspnea





Repeat EKG was done because the patient was breathing better at this time.EKG 

shows atrial fibrillation with  bpm  QT interval 324 QTC is 448.  

Patient's EKG shows no ST segment elevation.





CT of the chest shows right lower lobe pneumonia versus aspiration and a left 

pleural effusion.  I started the patient on antibiotics.  I called the 

cardiothoracic surgeons to make him aware of the patient's admission.  I spoke 

with Dr. Vang he agreed to admit the patient.  I wrote admitting orders I 

consult the cardiothoracic surgery and pulmonary





- Lab Data


Result diagrams: 


 18 02:00





 18 03:31


 Lab Results











  18 Range/Units





  02:00 02:00 03:31 


 


WBC  17.7 H    (3.8-10.6)  k/uL


 


RBC  4.86    (4.30-5.90)  m/uL


 


Hgb  13.3  D    (13.0-17.5)  gm/dL


 


Hct  42.4    (39.0-53.0)  %


 


MCV  87.2  D    (80.0-100.0)  fL


 


MCH  27.3    (25.0-35.0)  pg


 


MCHC  31.3    (31.0-37.0)  g/dL


 


RDW  16.2 H    (11.5-15.5)  %


 


Plt Count  326    (150-450)  k/uL


 


Neutrophils % (Manual)  62    %


 


Band Neutrophils %  23    %


 


Lymphocytes % (Manual)  10    %


 


Monocytes % (Manual)  6    %


 


Neutrophils # (Manual)  15.00 H    (1.3-7.7)  k/uL


 


Lymphocytes # (Manual)  1.77    (1.0-4.8)  k/uL


 


Monocytes # (Manual)  1.06 H    (0-1.0)  k/uL


 


Nucleated RBCs  0    (0-0)  /100 WBC


 


Polychromasia  Present    


 


Hypochromasia  Marked    


 


Poikilocytosis  Slight    


 


Anisocytosis  Slight    


 


PT    12.1 H  (9.0-12.0)  sec


 


INR    1.3 H  (<1.2)  


 


APTT    22.2  (22.0-30.0)  sec


 


D-Dimer    2.71 H  (<0.60)  mg/L FEU


 


Sodium     (137-145)  mmol/L


 


Potassium     (3.5-5.1)  mmol/L


 


Chloride     ()  mmol/L


 


Carbon Dioxide     (22-30)  mmol/L


 


Anion Gap     mmol/L


 


BUN     (9-20)  mg/dL


 


Creatinine     (0.66-1.25)  mg/dL


 


Est GFR (CKD-EPI)AfAm     (>60 ml/min/1.73 sqM)  


 


Est GFR (CKD-EPI)NonAf     (>60 ml/min/1.73 sqM)  


 


Glucose     (74-99)  mg/dL


 


Calcium     (8.4-10.2)  mg/dL


 


Total Bilirubin     (0.2-1.3)  mg/dL


 


AST     (17-59)  U/L


 


ALT     (21-72)  U/L


 


Alkaline Phosphatase     ()  U/L


 


Total Creatine Kinase     ()  U/L


 


CK-MB (CK-2)     (0.0-2.4)  ng/mL


 


CK-MB (CK-2) Rel Index     


 


Troponin I     (0.000-0.034)  ng/mL


 


NT-Pro-B Natriuret Pep   2480   pg/mL


 


Total Protein     (6.3-8.2)  g/dL


 


Albumin     (3.5-5.0)  g/dL














  18 Range/Units





  03:31 03:31 


 


WBC    (3.8-10.6)  k/uL


 


RBC    (4.30-5.90)  m/uL


 


Hgb    (13.0-17.5)  gm/dL


 


Hct    (39.0-53.0)  %


 


MCV    (80.0-100.0)  fL


 


MCH    (25.0-35.0)  pg


 


MCHC    (31.0-37.0)  g/dL


 


RDW    (11.5-15.5)  %


 


Plt Count    (150-450)  k/uL


 


Neutrophils % (Manual)    %


 


Band Neutrophils %    %


 


Lymphocytes % (Manual)    %


 


Monocytes % (Manual)    %


 


Neutrophils # (Manual)    (1.3-7.7)  k/uL


 


Lymphocytes # (Manual)    (1.0-4.8)  k/uL


 


Monocytes # (Manual)    (0-1.0)  k/uL


 


Nucleated RBCs    (0-0)  /100 WBC


 


Polychromasia    


 


Hypochromasia    


 


Poikilocytosis    


 


Anisocytosis    


 


PT    (9.0-12.0)  sec


 


INR    (<1.2)  


 


APTT    (22.0-30.0)  sec


 


D-Dimer    (<0.60)  mg/L FEU


 


Sodium  137   (137-145)  mmol/L


 


Potassium  4.4   (3.5-5.1)  mmol/L


 


Chloride  96 L   ()  mmol/L


 


Carbon Dioxide  31 H   (22-30)  mmol/L


 


Anion Gap  10   mmol/L


 


BUN  31 H   (9-20)  mg/dL


 


Creatinine  0.70   (0.66-1.25)  mg/dL


 


Est GFR (CKD-EPI)AfAm  >90   (>60 ml/min/1.73 sqM)  


 


Est GFR (CKD-EPI)NonAf  >90   (>60 ml/min/1.73 sqM)  


 


Glucose  112 H   (74-99)  mg/dL


 


Calcium  9.2   (8.4-10.2)  mg/dL


 


Total Bilirubin  0.9   (0.2-1.3)  mg/dL


 


AST  29   (17-59)  U/L


 


ALT  36   (21-72)  U/L


 


Alkaline Phosphatase  101   ()  U/L


 


Total Creatine Kinase   28 L  ()  U/L


 


CK-MB (CK-2)   1.9  (0.0-2.4)  ng/mL


 


CK-MB (CK-2) Rel Index   6.8  


 


Troponin I   0.031  (0.000-0.034)  ng/mL


 


NT-Pro-B Natriuret Pep    pg/mL


 


Total Protein  6.2 L   (6.3-8.2)  g/dL


 


Albumin  3.5   (3.5-5.0)  g/dL














Disposition


Clinical Impression: 


 Pneumonia, Pleural effusion





Disposition: ADMITTED AS IP TO THIS HOSP


Referrals: 


Javan Vang MD [Primary Care Provider] - 1-2 days


Time of Disposition: 05:49 Other

## 2023-04-05 NOTE — DISCHARGE NOTE OB - CARE PLAN
1 Principal Discharge DX:	 delivery delivered  Assessment and plan of treatment:	Assessment and Plan of Treatment: Please call your provider to schedule postoperative wound check visit in 1-2 weeks. Take medications as directed, regular diet, activity as tolerated. Exclusive breast feeding for the first 6 months is recommended. Nothing per vagina for 6 weeks (incl. sex, douching, etc). If you have additional concerns, please inform your provider.  Secondary Diagnosis:	Status post bilateral salpingectomy  Assessment and plan of treatment:	As above

## 2023-04-05 NOTE — DISCHARGE NOTE OB - MATERIALS PROVIDED
Vaccinations/Breastfeeding Log/Breastfeeding Mother’s Support Group Information/Guide to Postpartum Care/[facility] Hearing Screen Program/Shaken Baby Prevention Handout/Breastfeeding Guide and Packet/Birth Certificate Instructions/Discharge Medication Information for Patients and Families Pocket Guide/Tdap Vaccination (VIS Pub Date: January 24, 2012)

## 2023-04-06 ENCOUNTER — NON-APPOINTMENT (OUTPATIENT)
Age: 38
End: 2023-04-06

## 2023-04-06 LAB
ALBUMIN SERPL ELPH-MCNC: 2.8 G/DL — LOW (ref 3.3–5.2)
ALP SERPL-CCNC: 153 U/L — HIGH (ref 40–120)
ALT FLD-CCNC: 13 U/L — SIGNIFICANT CHANGE UP
ANION GAP SERPL CALC-SCNC: 14 MMOL/L — SIGNIFICANT CHANGE UP (ref 5–17)
AST SERPL-CCNC: 17 U/L — SIGNIFICANT CHANGE UP
BASOPHILS # BLD AUTO: 0.01 K/UL — SIGNIFICANT CHANGE UP (ref 0–0.2)
BASOPHILS NFR BLD AUTO: 0.1 % — SIGNIFICANT CHANGE UP (ref 0–2)
BILIRUB SERPL-MCNC: <0.2 MG/DL — LOW (ref 0.4–2)
BUN SERPL-MCNC: 12.1 MG/DL — SIGNIFICANT CHANGE UP (ref 8–20)
CALCIUM SERPL-MCNC: 9 MG/DL — SIGNIFICANT CHANGE UP (ref 8.4–10.5)
CHLORIDE SERPL-SCNC: 104 MMOL/L — SIGNIFICANT CHANGE UP (ref 96–108)
CO2 SERPL-SCNC: 20 MMOL/L — LOW (ref 22–29)
CREAT SERPL-MCNC: 0.64 MG/DL — SIGNIFICANT CHANGE UP (ref 0.5–1.3)
EGFR: 115 ML/MIN/1.73M2 — SIGNIFICANT CHANGE UP
EOSINOPHIL # BLD AUTO: 0.1 K/UL — SIGNIFICANT CHANGE UP (ref 0–0.5)
EOSINOPHIL NFR BLD AUTO: 0.9 % — SIGNIFICANT CHANGE UP (ref 0–6)
GLUCOSE SERPL-MCNC: 80 MG/DL — SIGNIFICANT CHANGE UP (ref 70–99)
HCT VFR BLD CALC: 32.3 % — LOW (ref 34.5–45)
HGB BLD-MCNC: 10.7 G/DL — LOW (ref 11.5–15.5)
IMM GRANULOCYTES NFR BLD AUTO: 0.8 % — SIGNIFICANT CHANGE UP (ref 0–0.9)
LYMPHOCYTES # BLD AUTO: 1.72 K/UL — SIGNIFICANT CHANGE UP (ref 1–3.3)
LYMPHOCYTES # BLD AUTO: 16 % — SIGNIFICANT CHANGE UP (ref 13–44)
MCHC RBC-ENTMCNC: 30.9 PG — SIGNIFICANT CHANGE UP (ref 27–34)
MCHC RBC-ENTMCNC: 33.1 GM/DL — SIGNIFICANT CHANGE UP (ref 32–36)
MCV RBC AUTO: 93.4 FL — SIGNIFICANT CHANGE UP (ref 80–100)
MONOCYTES # BLD AUTO: 0.73 K/UL — SIGNIFICANT CHANGE UP (ref 0–0.9)
MONOCYTES NFR BLD AUTO: 6.8 % — SIGNIFICANT CHANGE UP (ref 2–14)
NEUTROPHILS # BLD AUTO: 8.1 K/UL — HIGH (ref 1.8–7.4)
NEUTROPHILS NFR BLD AUTO: 75.4 % — SIGNIFICANT CHANGE UP (ref 43–77)
PLATELET # BLD AUTO: 203 K/UL — SIGNIFICANT CHANGE UP (ref 150–400)
POTASSIUM SERPL-MCNC: 4.2 MMOL/L — SIGNIFICANT CHANGE UP (ref 3.5–5.3)
POTASSIUM SERPL-SCNC: 4.2 MMOL/L — SIGNIFICANT CHANGE UP (ref 3.5–5.3)
PROT SERPL-MCNC: 5.6 G/DL — LOW (ref 6.6–8.7)
RBC # BLD: 3.46 M/UL — LOW (ref 3.8–5.2)
RBC # FLD: 13 % — SIGNIFICANT CHANGE UP (ref 10.3–14.5)
SODIUM SERPL-SCNC: 138 MMOL/L — SIGNIFICANT CHANGE UP (ref 135–145)
WBC # BLD: 10.75 K/UL — HIGH (ref 3.8–10.5)
WBC # FLD AUTO: 10.75 K/UL — HIGH (ref 3.8–10.5)

## 2023-04-06 RX ORDER — IBUPROFEN 200 MG
600 TABLET ORAL EVERY 6 HOURS
Refills: 0 | Status: DISCONTINUED | OUTPATIENT
Start: 2023-04-06 | End: 2023-04-07

## 2023-04-06 RX ADMIN — SCOPALAMINE 1 PATCH: 1 PATCH, EXTENDED RELEASE TRANSDERMAL at 12:00

## 2023-04-06 RX ADMIN — Medication 975 MILLIGRAM(S): at 09:14

## 2023-04-06 RX ADMIN — ENOXAPARIN SODIUM 60 MILLIGRAM(S): 100 INJECTION SUBCUTANEOUS at 22:10

## 2023-04-06 RX ADMIN — Medication 600 MILLIGRAM(S): at 17:46

## 2023-04-06 RX ADMIN — Medication 975 MILLIGRAM(S): at 15:40

## 2023-04-06 RX ADMIN — SCOPALAMINE 1 PATCH: 1 PATCH, EXTENDED RELEASE TRANSDERMAL at 07:00

## 2023-04-06 RX ADMIN — SIMETHICONE 80 MILLIGRAM(S): 80 TABLET, CHEWABLE ORAL at 09:14

## 2023-04-06 RX ADMIN — Medication 975 MILLIGRAM(S): at 22:10

## 2023-04-06 RX ADMIN — Medication 30 MILLIGRAM(S): at 11:33

## 2023-04-06 RX ADMIN — SIMETHICONE 80 MILLIGRAM(S): 80 TABLET, CHEWABLE ORAL at 15:41

## 2023-04-06 RX ADMIN — Medication 30 MILLIGRAM(S): at 05:56

## 2023-04-07 VITALS
RESPIRATION RATE: 18 BRPM | HEART RATE: 87 BPM | TEMPERATURE: 98 F | OXYGEN SATURATION: 97 % | SYSTOLIC BLOOD PRESSURE: 124 MMHG | DIASTOLIC BLOOD PRESSURE: 79 MMHG

## 2023-04-07 PROCEDURE — 88302 TISSUE EXAM BY PATHOLOGIST: CPT

## 2023-04-07 PROCEDURE — 83615 LACTATE (LD) (LDH) ENZYME: CPT

## 2023-04-07 PROCEDURE — 84156 ASSAY OF PROTEIN URINE: CPT

## 2023-04-07 PROCEDURE — 85025 COMPLETE CBC W/AUTO DIFF WBC: CPT

## 2023-04-07 PROCEDURE — 84550 ASSAY OF BLOOD/URIC ACID: CPT

## 2023-04-07 PROCEDURE — 88305 TISSUE EXAM BY PATHOLOGIST: CPT

## 2023-04-07 PROCEDURE — 80053 COMPREHEN METABOLIC PANEL: CPT

## 2023-04-07 PROCEDURE — 36415 COLL VENOUS BLD VENIPUNCTURE: CPT

## 2023-04-07 PROCEDURE — 59050 FETAL MONITOR W/REPORT: CPT

## 2023-04-07 PROCEDURE — 82570 ASSAY OF URINE CREATININE: CPT

## 2023-04-07 PROCEDURE — 86780 TREPONEMA PALLIDUM: CPT

## 2023-04-07 PROCEDURE — 85730 THROMBOPLASTIN TIME PARTIAL: CPT

## 2023-04-07 PROCEDURE — 85384 FIBRINOGEN ACTIVITY: CPT

## 2023-04-07 PROCEDURE — 86769 SARS-COV-2 COVID-19 ANTIBODY: CPT

## 2023-04-07 PROCEDURE — 81001 URINALYSIS AUTO W/SCOPE: CPT

## 2023-04-07 PROCEDURE — 85610 PROTHROMBIN TIME: CPT

## 2023-04-07 RX ORDER — ACETAMINOPHEN 500 MG
3 TABLET ORAL
Qty: 0 | Refills: 0 | DISCHARGE
Start: 2023-04-07

## 2023-04-07 RX ORDER — IBUPROFEN 200 MG
1 TABLET ORAL
Qty: 0 | Refills: 0 | DISCHARGE
Start: 2023-04-07

## 2023-04-07 RX ADMIN — Medication 600 MILLIGRAM(S): at 06:29

## 2023-04-07 RX ADMIN — Medication 600 MILLIGRAM(S): at 00:56

## 2023-04-07 NOTE — PROGRESS NOTE ADULT - SUBJECTIVE AND OBJECTIVE BOX
POD # 2    Patient resting comfortably in NAD  Afebrile  VSS  Abdomen  soft  Not tender  Fundus  firm  Incision  Dressing in Place  dry  Extrem  No Homans   Lochia Nl    Flatus ++  Voiding ++    Patient s/p R c/s BS  DC to home    F/U office 3 days 
IRMA WARREN is a 37y  now POD#2 s/p repeat  section/ bilateral salpingectomy at 39w gestation, c/b cHTN,.    S:    No acute events overnight.   The patient has no complaints.  Pain controlled with current treatment regimen.   She is ambulating without difficulty and tolerating PO.   + flatus/-BM/+ voiding   She endorses appropriate lochia, which is decreasing.   She denies fevers, chills, nausea and vomiting.   She denies lightheadedness, dizziness, palpitations, chest pain and SOB.     O:    T(C): 36.5 (23 @ 04:53), Max: 36.8 (23 @ 08:54)  HR: 64 (23 @ 04:53) (64 - 75)  BP: 122/81 (23 @ 04:53) (114/68 - 122/81)  RR: 18 (23 @ 04:53) (18 - 18)  SpO2: 97% (23 @ 04:53) (97% - 98%)    Gen: NAD, AOx3  Breast: Nontender, non-engorged   Abdomen:  Soft, non-tender, non-distended  Incision: Clean/dry/intact with mepilex dressing in place   Uterus:  Fundus firm below umbilicus  VE:  Expectant lochia  Ext:  Non-tender and non-edematous                   
POD # 1    Patient resting comfortably in NAD  Afebrile VSS  Abdomen  soft  Not tender  Fundus  firm  Incision  Dressing in place  dry  Extrem. No Homans  Lochia  nl    WBC 10.75  H/H 10.7/32.3  Platelet 203  B +  VDRL Neg  COVID Neg Ab +    Patoient s/p R c/s BS  Continue post op care
IRMA WARREN is a 37y  now POD#1 s/p repeat  section/ bilateral salpingectomy at 39w gestation, c/b cHTN,.    S:    No acute events overnight.   The patient has no complaints.  Pain controlled with current treatment regimen.   She is ambulating without difficulty and tolerating PO.   + flatus/-BM/+ voiding   She endorses appropriate lochia, which is decreasing.   She is bottle feeding without difficulty.   She denies fevers, chills, nausea and vomiting.   She denies lightheadedness, dizziness, palpitations, chest pain and SOB.     O:    T(C): 36.8 (23 @ 03:21), Max: 36.8 (23 @ 15:10)  HR: 62 (23 @ 03:21) (55 - 75)  BP: 100/60 (23 @ 03:21) (93/71 - 127/85)  RR: 18 (23 @ 03:21) (12 - 18)  SpO2: 98% (23 @ 03:21) (94% - 100%)    Gen: NAD, AOx3  Breast: Nontender, non-engorged   Abdomen:  Soft, non-tender, non-distended  Incision: Clean/dry/intact with mepilex in place   Uterus:  Fundus firm below umbilicus  VE:  Expectant lochia  Ext:  Non-tender and non-edematous

## 2023-04-07 NOTE — PROGRESS NOTE ADULT - ATTENDING COMMENTS
POD#2 from RCS +BS doing well  Discharge home once baby cleared  Osiris Koenig
37y  now stable POD#1 s/p repeat  section/ bilateral salpingectom, cHTN with no s/s pre-eclampsia

## 2023-04-07 NOTE — PROGRESS NOTE ADULT - ASSESSMENT
INTERVAL HPI/OVERNIGHT EVENTS:  37y Female s/p c section under spinal anesthesia with duramorph for post op analgesia on 4/5/23    Vital Signs Last 24 Hrs  T(C): 36.8 (06 Apr 2023 03:21), Max: 36.8 (05 Apr 2023 15:10)  T(F): 98.2 (06 Apr 2023 03:21), Max: 98.2 (05 Apr 2023 15:10)  HR: 62 (06 Apr 2023 03:21) (55 - 75)  BP: 100/60 (06 Apr 2023 03:21) (93/71 - 127/85)  BP(mean): 86 (05 Apr 2023 15:10) (73 - 100)  RR: 18 (06 Apr 2023 03:21) (12 - 18)  SpO2: 98% (06 Apr 2023 03:21) (94% - 100%)    Parameters below as of 06 Apr 2023 00:02  Patient On (Oxygen Delivery Method): room air            Patient's overall anesthesia satisfaction: Positive    Patients pain is well controlled with IT duramorph    No respiratory events overnight    No pruritis at this time    Patient seen and doing well     No headache      No residual numbness or weakness, sensory and motor function intact.    No anesthetic complications or complaints noted or reported          .            
IRMA WARREN is a 37y  now POD#1 s/p repeat  section/ bilateral salpingectomy at 39w gestation, c/b cHTN,.    -Vital signs stable  -cHTN on labetalol 100 BID well controlled  -Hgb: 11.4-> AM labs pending   -Voiding, tolerating PO, bowel function nml   -Advance care as tolerated   -Continue routine postpartum and postoperative care and education  -Healthy female infant  -Dispo: Patient to be discharged when meeting all postpartum and postoperative milestones and pending attending approval.    Will d/w Dr. Cabrera
IRMA WARREN is a 37y  now POD#2 s/p repeat  section/ bilateral salpingectomy at 39w gestation, c/b cHTN,.    -Vital signs stable  -Hgb: 11.4>10.7  -Voiding, tolerating PO, bowel function nml   -Advance care as tolerated   -Continue routine postpartum and postoperative care and education  -Healthy female infant  -Dispo: Pt is stable, doing well and meeting all postpartum and postoperative milestones. Possible discharge to home today pending attending approval.    Will d/w Dr. Brown

## 2023-04-10 PROBLEM — I10 ESSENTIAL (PRIMARY) HYPERTENSION: Chronic | Status: ACTIVE | Noted: 2023-04-05

## 2023-04-10 PROBLEM — O03.9 COMPLETE OR UNSPECIFIED SPONTANEOUS ABORTION WITHOUT COMPLICATION: Chronic | Status: ACTIVE | Noted: 2023-04-05

## 2023-04-11 ENCOUNTER — APPOINTMENT (OUTPATIENT)
Dept: OBGYN | Facility: CLINIC | Age: 38
End: 2023-04-11
Payer: COMMERCIAL

## 2023-04-11 VITALS
SYSTOLIC BLOOD PRESSURE: 144 MMHG | WEIGHT: 190 LBS | DIASTOLIC BLOOD PRESSURE: 104 MMHG | BODY MASS INDEX: 30.67 KG/M2 | HEART RATE: 104 BPM

## 2023-04-11 LAB — SURGICAL PATHOLOGY STUDY: SIGNIFICANT CHANGE UP

## 2023-04-11 PROCEDURE — 0503F POSTPARTUM CARE VISIT: CPT

## 2023-04-11 NOTE — HISTORY OF PRESENT ILLNESS
[Delivery Date: ___] : on [unfilled] [Repeat C/S] : delivered by  section (repeat) [Female] : Delivery History: baby girl [Wt. ___] : weighing [unfilled] [Complications:___] : no complications [Breastfeeding] : not currently nursing [Clean/Dry/Intact] : clean, dry and intact [Erythema] : not erythematous [Swelling] : not swollen [Dehiscence] : not dehisced [Healed] : healed [None] : no vaginal bleeding [Cervix Sample Taken] : cervical sample not taken for a Pap smear [Not Done] : Examination of breasts not done [Doing Well] : is doing well [No Sign of Infection] : is showing no signs of infection [Excellent Pain Control] : has excellent pain control [de-identified] : Benign [FreeTextEntry1] : Patient is status post repeat  section on 2023 for a viable female infant Apgars 679 weighing 6 pounds 13 ounces\par Patient states she is not breast-feeding\par Patient denies any difficulty passing flatus, voiding, bowel movements,\par Mepilex dressing removed\par Incision clean dry and intact healing well\par Follow-up 2 weeks\par Pathology report from left ovarian cystectomy still pending

## 2023-04-28 ENCOUNTER — APPOINTMENT (OUTPATIENT)
Dept: OBGYN | Facility: CLINIC | Age: 38
End: 2023-04-28
Payer: COMMERCIAL

## 2023-04-28 VITALS
HEIGHT: 66 IN | SYSTOLIC BLOOD PRESSURE: 108 MMHG | HEART RATE: 91 BPM | WEIGHT: 186 LBS | BODY MASS INDEX: 29.89 KG/M2 | DIASTOLIC BLOOD PRESSURE: 72 MMHG

## 2023-04-28 DIAGNOSIS — O09.529 SUPERVISION OF ELDERLY MULTIGRAVIDA, UNSPECIFIED TRIMESTER: ICD-10-CM

## 2023-04-28 DIAGNOSIS — R82.90 UNSPECIFIED ABNORMAL FINDINGS IN URINE: ICD-10-CM

## 2023-04-28 DIAGNOSIS — I10 ESSENTIAL (PRIMARY) HYPERTENSION: ICD-10-CM

## 2023-04-28 DIAGNOSIS — O10.919 UNSPECIFIED PRE-EXISTING HYPERTENSION COMPLICATING PREGNANCY, UNSPECIFIED TRIMESTER: ICD-10-CM

## 2023-04-28 DIAGNOSIS — Z36.85 ENCOUNTER FOR ANTENATAL SCREENING FOR STREPTOCOCCUS B: ICD-10-CM

## 2023-04-28 DIAGNOSIS — O24.410 GESTATIONAL DIABETES MELLITUS IN PREGNANCY, DIET CONTROLLED: ICD-10-CM

## 2023-04-28 PROCEDURE — 0503F POSTPARTUM CARE VISIT: CPT

## 2023-04-28 NOTE — HISTORY OF PRESENT ILLNESS
[Delivery Date: ___] : on [unfilled] [Repeat C/S] : delivered by  section (repeat) [Female] : Delivery History: baby girl [Wt. ___] : weighing [unfilled] [Breastfeeding] : not currently nursing [None] : No associated symptoms are reported [Clean/Dry/Intact] : clean, dry and intact [Erythema] : erythematous [Healed] : healed [Cervix Sample Taken] : cervical sample not taken for a Pap smear [Not Done] : Examination of breasts not done [Doing Well] : is doing well [No Sign of Infection] : is showing no signs of infection [Excellent Pain Control] : has excellent pain control [FreeTextEntry1] : Patient is a 37-year-old status post repeat  section and bilateral salpingectomy on 2023 for viable female infant weighing 6 pounds 13 ounces\par Patient denies breast-feeding\par Patient denies any difficulty passing flatus, voiding, or bowel movements,\par Incision well-healed, dry, intact,\par Follow-up 4 weeks

## 2023-05-25 ENCOUNTER — APPOINTMENT (OUTPATIENT)
Dept: OBGYN | Facility: CLINIC | Age: 38
End: 2023-05-25

## 2023-05-25 VITALS
SYSTOLIC BLOOD PRESSURE: 136 MMHG | BODY MASS INDEX: 29.73 KG/M2 | HEIGHT: 66 IN | HEART RATE: 85 BPM | DIASTOLIC BLOOD PRESSURE: 88 MMHG | WEIGHT: 185 LBS

## 2023-06-02 ENCOUNTER — APPOINTMENT (OUTPATIENT)
Dept: OBGYN | Facility: CLINIC | Age: 38
End: 2023-06-02
Payer: COMMERCIAL

## 2023-06-02 VITALS
DIASTOLIC BLOOD PRESSURE: 82 MMHG | WEIGHT: 186 LBS | SYSTOLIC BLOOD PRESSURE: 137 MMHG | BODY MASS INDEX: 30.02 KG/M2 | HEART RATE: 75 BPM

## 2023-06-02 PROCEDURE — 0503F POSTPARTUM CARE VISIT: CPT

## 2023-06-02 NOTE — HISTORY OF PRESENT ILLNESS
[Breastfeeding] : not currently nursing [None] : The patient is currently asymptomatic [Clean/Dry/Intact] : clean, dry and intact [Erythema] : not erythematous [Swelling] : not swollen [Dehiscence] : not dehisced [Healed] : healed [Back to Normal] : is back to normal in size [Normal] : the vagina was normal [Cervix Sample Taken] : cervical sample not taken for a Pap smear [Not Done] : Examination of breasts not done [Doing Well] : is doing well [No Sign of Infection] : is showing no signs of infection [Excellent Pain Control] : has excellent pain control [FreeTextEntry1] : Patient is a 37-year-old status post  section and bilateral salpingectomy on 2023 for viable female infant weighing 6 pounds 13 ounces\par Patient denies breast-feeding\par Patient denies any difficulty passing urine, passing flatus, bowel movements, incision clean dry intact healed well\par Patient denies any complaints\par Follow-up 6 months to start her annual visit

## 2024-02-06 ENCOUNTER — ASOB RESULT (OUTPATIENT)
Age: 39
End: 2024-02-06

## 2024-02-06 ENCOUNTER — APPOINTMENT (OUTPATIENT)
Dept: OBGYN | Facility: CLINIC | Age: 39
End: 2024-02-06
Payer: COMMERCIAL

## 2024-02-06 VITALS
HEART RATE: 80 BPM | WEIGHT: 192 LBS | BODY MASS INDEX: 30.99 KG/M2 | SYSTOLIC BLOOD PRESSURE: 153 MMHG | DIASTOLIC BLOOD PRESSURE: 91 MMHG

## 2024-02-06 DIAGNOSIS — Z98.891 HISTORY OF UTERINE SCAR FROM PREVIOUS SURGERY: ICD-10-CM

## 2024-02-06 DIAGNOSIS — R92.30 DENSE BREASTS, UNSPECIFIED: ICD-10-CM

## 2024-02-06 PROCEDURE — 76856 US EXAM PELVIC COMPLETE: CPT | Mod: 59

## 2024-02-06 PROCEDURE — 99213 OFFICE O/P EST LOW 20 MIN: CPT

## 2024-02-06 PROCEDURE — 76830 TRANSVAGINAL US NON-OB: CPT

## 2024-02-06 NOTE — PLAN
[FreeTextEntry1] : Patient is a 38-year-old  6 para 3-0-3-3 last menstrual period 2024 Patient presents for relation complaining of prolonged heavy bleeding with passage of clots Physical exam reveals a well-developed well-nourished female no apparent distress,, BMI 30 Pelvic exam shows normal female external genitalia, vagina no lesions, cervix appropriate size nontender, uterus anteverted normal size nontender, adnexa no mass nontender Pelvic sonogram Uterus 6.56 x 6.24 x 4.48 Cervical length 4.2 cm Endometrial thickness 9.3 mm Small intramural lower uterine segment fibroid measuring 1.79 x 1.73 x 1.97 Right ovary 2.11 x 2.7 x 2.16 Left ovary 5.48 x 5.6 x 6.11 with a septated cyst measuring 3.76 x 3.82 x 3.8 No adnexal mass No free fluid seen Results discussed with patient Reassured Patient will be scheduled for endometrial sampling to complete evaluation and then discuss options based on the pathology report Questions answered Patient that she understands  Gena was present as a chaperone for the entire assessment and examination of this patient and a follow-up discussion of the sono results was carried out in the office consultation room

## 2024-02-06 NOTE — HISTORY OF PRESENT ILLNESS
[FreeTextEntry1] : Patient is a 38-year-old  6 para 3-0-3-3 last menstrual period 2024 Patient presents valuation complaining of heavy prolonged bleeding

## 2024-02-06 NOTE — PHYSICAL EXAM
[Labia Majora] : normal [Labia Minora] : normal [Normal] : normal [Anteversion] : anteverted [Uterine Adnexae] : normal [Tenderness] : nontender [Mass ___ cm] : no uterine mass was palpated

## 2024-02-22 ENCOUNTER — APPOINTMENT (OUTPATIENT)
Dept: OBGYN | Facility: CLINIC | Age: 39
End: 2024-02-22
Payer: COMMERCIAL

## 2024-02-22 VITALS
DIASTOLIC BLOOD PRESSURE: 89 MMHG | SYSTOLIC BLOOD PRESSURE: 136 MMHG | BODY MASS INDEX: 31.5 KG/M2 | HEART RATE: 88 BPM | HEIGHT: 66 IN | WEIGHT: 196 LBS

## 2024-02-22 DIAGNOSIS — N92.0 EXCESSIVE AND FREQUENT MENSTRUATION WITH REGULAR CYCLE: ICD-10-CM

## 2024-02-22 DIAGNOSIS — Z01.419 ENCOUNTER FOR GYNECOLOGICAL EXAMINATION (GENERAL) (ROUTINE) W/OUT ABNORMAL FINDINGS: ICD-10-CM

## 2024-02-22 DIAGNOSIS — Z12.4 ENCOUNTER FOR SCREENING FOR MALIGNANT NEOPLASM OF CERVIX: ICD-10-CM

## 2024-02-22 DIAGNOSIS — Z12.39 ENCOUNTER FOR OTHER SCREENING FOR MALIGNANT NEOPLASM OF BREAST: ICD-10-CM

## 2024-02-22 DIAGNOSIS — Z11.51 ENCOUNTER FOR SCREENING FOR HUMAN PAPILLOMAVIRUS (HPV): ICD-10-CM

## 2024-02-22 DIAGNOSIS — Z92.89 PERSONAL HISTORY OF OTHER MEDICAL TREATMENT: ICD-10-CM

## 2024-02-22 PROCEDURE — 58100 BIOPSY OF UTERUS LINING: CPT

## 2024-02-22 NOTE — PROCEDURE
[Endometrial Biopsy] : Endometrial biopsy [Consent Obtained] : Consent obtained [Thickened Endometrium] : thickened endometrium [Risks] : risks [Alternatives] : alternatives [Patient] : patient [Infection] : infection [Negative] : negative pregnancy test [No Premedication] : No premedication [None] : none [Tenaculum] : Tenaculum [Easy Passage] : Easy passage [Sounded to ___ cm] : sounded to [unfilled] ~Ucm [Anteverted] : anteverted [Moderate] : moderate [Specimen Collected] : collected [Sent to Pathology] : placed in buffered formalin and sent for pathology [Tolerated Well] : Patient tolerated the procedure well [No Complications] : No complications [de-identified] : Menorrhagia [LMPDate] : 02/14/24 [de-identified] : Pipelle endometrial sampling catheter

## 2024-02-22 NOTE — ASSESSMENT
[FreeTextEntry1] : Patient is a 38-year-old  6 para 3-0-3-3 last menstrual period 2024 Patient presents for endometrial sampling with history of heavy periods with clots and also thickened lining on sonogram Patient was placed in the exam room and after informed consent obtained patient was placed in the dorsolithotomy position Sterile speculum was placed in the vaginal vault Cervix and vaginal vault was cleansed with Betadine solution Anterior lip of the cervix grasped with a single-tooth tenaculum Easy passage of 5 Pipelle endometrial sampling catheter in the cavity of the uterus and moderate amount of tissue was obtained and sent to pathology All instruments removed the vaginal vault All instrument counts patient tolerated well No complications Urine pregnant test was negative Follow-up 2 weeks to discuss results  Venessa was present as a chaperone as an assistant for the entire assessment and examination of this patient

## 2024-03-04 LAB — CORE LAB BIOPSY: NORMAL

## 2024-03-07 ENCOUNTER — APPOINTMENT (OUTPATIENT)
Dept: OBGYN | Facility: CLINIC | Age: 39
End: 2024-03-07
Payer: COMMERCIAL

## 2024-03-07 VITALS
HEIGHT: 66 IN | SYSTOLIC BLOOD PRESSURE: 150 MMHG | HEART RATE: 73 BPM | DIASTOLIC BLOOD PRESSURE: 98 MMHG | BODY MASS INDEX: 32.14 KG/M2 | WEIGHT: 200 LBS

## 2024-03-07 DIAGNOSIS — Z71.9 COUNSELING, UNSPECIFIED: ICD-10-CM

## 2024-03-07 DIAGNOSIS — N84.0 POLYP OF CORPUS UTERI: ICD-10-CM

## 2024-03-07 DIAGNOSIS — Z98.890 OTHER SPECIFIED POSTPROCEDURAL STATES: ICD-10-CM

## 2024-03-07 PROCEDURE — 99213 OFFICE O/P EST LOW 20 MIN: CPT

## 2024-03-07 NOTE — HISTORY OF PRESENT ILLNESS
[FreeTextEntry1] : Patient is a 38-year-old  6 para 3-0-3-3 last menstrual period 2024 Patient presents for follow-up after undergoing dose neutral biopsy on  Patient has history of prolonged heavy bleeding

## 2024-03-07 NOTE — PLAN
[FreeTextEntry1] : Patient is a 38-year-old  6 para 3-0-3-3 last menstrual period 2024 Patient presents for follow-up after undergoing endometrial biopsy on 2024 for history of prolonged heavy bleeding Pathology report shows fragments of disordered proliferative endometrium and fragments of endometrial polyp Results discussed with patient Reassured Patient will be sent for sonohysterography to further evaluate any possible endometrial polyp remnants or other polyps being present Discussed with patient that if indeed there is any evidence of endometrial polyps she will need to undergo under anesthesia hysteroscopy D&C polypectomy Questions answered Patient that she understands  Patient was seen in the office consultation room for discussion of the results and not examined during this visit

## 2024-06-28 ENCOUNTER — OUTPATIENT (OUTPATIENT)
Dept: OUTPATIENT SERVICES | Facility: HOSPITAL | Age: 39
LOS: 1 days | End: 2024-06-28
Payer: COMMERCIAL

## 2024-06-28 ENCOUNTER — APPOINTMENT (OUTPATIENT)
Dept: ULTRASOUND IMAGING | Facility: CLINIC | Age: 39
End: 2024-06-28
Payer: COMMERCIAL

## 2024-06-28 DIAGNOSIS — Z98.891 HISTORY OF UTERINE SCAR FROM PREVIOUS SURGERY: Chronic | ICD-10-CM

## 2024-06-28 DIAGNOSIS — N84.0 POLYP OF CORPUS UTERI: ICD-10-CM

## 2024-06-28 DIAGNOSIS — Z98.890 OTHER SPECIFIED POSTPROCEDURAL STATES: Chronic | ICD-10-CM

## 2024-06-28 PROCEDURE — 58340 CATHETER FOR HYSTEROGRAPHY: CPT

## 2024-06-28 PROCEDURE — 76831 ECHO EXAM UTERUS: CPT

## 2024-06-28 PROCEDURE — 76831 ECHO EXAM UTERUS: CPT | Mod: 26

## 2025-07-29 ENCOUNTER — TRANSCRIPTION ENCOUNTER (OUTPATIENT)
Age: 40
End: 2025-07-29